# Patient Record
Sex: FEMALE | Race: WHITE | Employment: UNEMPLOYED | URBAN - METROPOLITAN AREA
[De-identification: names, ages, dates, MRNs, and addresses within clinical notes are randomized per-mention and may not be internally consistent; named-entity substitution may affect disease eponyms.]

---

## 2022-04-27 ENCOUNTER — APPOINTMENT (OUTPATIENT)
Dept: MRI IMAGING | Age: 67
DRG: 418 | End: 2022-04-27
Attending: HOSPITALIST
Payer: MEDICARE

## 2022-04-27 ENCOUNTER — HOSPITAL ENCOUNTER (INPATIENT)
Age: 67
LOS: 4 days | Discharge: HOME | DRG: 418 | End: 2022-05-03
Attending: EMERGENCY MEDICINE | Admitting: INTERNAL MEDICINE
Payer: MEDICARE

## 2022-04-27 ENCOUNTER — APPOINTMENT (OUTPATIENT)
Dept: CT IMAGING | Age: 67
DRG: 418 | End: 2022-04-27
Attending: EMERGENCY MEDICINE
Payer: MEDICARE

## 2022-04-27 DIAGNOSIS — K85.10 ACUTE BILIARY PANCREATITIS, UNSPECIFIED COMPLICATION STATUS: ICD-10-CM

## 2022-04-27 DIAGNOSIS — K85.00 IDIOPATHIC ACUTE PANCREATITIS WITHOUT INFECTION OR NECROSIS: Primary | ICD-10-CM

## 2022-04-27 PROBLEM — K85.90 ACUTE PANCREATITIS: Status: ACTIVE | Noted: 2022-04-27

## 2022-04-27 LAB
ALBUMIN SERPL-MCNC: 4.1 G/DL (ref 3.5–5)
ALBUMIN/GLOB SERPL: 1.3 {RATIO} (ref 1.1–2.2)
ALP SERPL-CCNC: 98 U/L (ref 45–117)
ALT SERPL-CCNC: 94 U/L (ref 12–78)
AMYLASE SERPL-CCNC: >1300 U/L (ref 25–115)
ANION GAP SERPL CALC-SCNC: 8 MMOL/L (ref 5–15)
APPEARANCE UR: CLEAR
AST SERPL-CCNC: 140 U/L (ref 15–37)
ATRIAL RATE: 103 BPM
BACTERIA URNS QL MICRO: NEGATIVE /HPF
BASOPHILS # BLD: 0 K/UL (ref 0–0.1)
BASOPHILS NFR BLD: 0 % (ref 0–1)
BILIRUB SERPL-MCNC: 0.9 MG/DL (ref 0.2–1)
BILIRUB UR QL: NEGATIVE
BUN SERPL-MCNC: 15 MG/DL (ref 6–20)
BUN/CREAT SERPL: 15 (ref 12–20)
CALCIUM SERPL-MCNC: 9.3 MG/DL (ref 8.5–10.1)
CALCULATED P AXIS, ECG09: 75 DEGREES
CALCULATED R AXIS, ECG10: 57 DEGREES
CALCULATED T AXIS, ECG11: -39 DEGREES
CHLORIDE SERPL-SCNC: 109 MMOL/L (ref 97–108)
CO2 SERPL-SCNC: 23 MMOL/L (ref 21–32)
COLOR UR: ABNORMAL
COMMENT, HOLDF: NORMAL
CREAT SERPL-MCNC: 0.99 MG/DL (ref 0.55–1.02)
DIAGNOSIS, 93000: NORMAL
DIFFERENTIAL METHOD BLD: ABNORMAL
EOSINOPHIL # BLD: 0 K/UL (ref 0–0.4)
EOSINOPHIL NFR BLD: 0 % (ref 0–7)
EPITH CASTS URNS QL MICRO: ABNORMAL /LPF
ERYTHROCYTE [DISTWIDTH] IN BLOOD BY AUTOMATED COUNT: 12.7 % (ref 11.5–14.5)
GLOBULIN SER CALC-MCNC: 3.2 G/DL (ref 2–4)
GLUCOSE SERPL-MCNC: 140 MG/DL (ref 65–100)
GLUCOSE UR STRIP.AUTO-MCNC: NEGATIVE MG/DL
HCT VFR BLD AUTO: 42.9 % (ref 35–47)
HGB BLD-MCNC: 13.9 G/DL (ref 11.5–16)
HGB UR QL STRIP: NEGATIVE
HYALINE CASTS URNS QL MICRO: ABNORMAL /LPF (ref 0–5)
IMM GRANULOCYTES # BLD AUTO: 0.2 K/UL (ref 0–0.04)
IMM GRANULOCYTES NFR BLD AUTO: 1 % (ref 0–0.5)
KETONES UR QL STRIP.AUTO: ABNORMAL MG/DL
LEUKOCYTE ESTERASE UR QL STRIP.AUTO: ABNORMAL
LIPASE SERPL-CCNC: >3000 U/L (ref 73–393)
LYMPHOCYTES # BLD: 0.6 K/UL (ref 0.8–3.5)
LYMPHOCYTES NFR BLD: 3 % (ref 12–49)
MCH RBC QN AUTO: 30.5 PG (ref 26–34)
MCHC RBC AUTO-ENTMCNC: 32.4 G/DL (ref 30–36.5)
MCV RBC AUTO: 94.3 FL (ref 80–99)
MONOCYTES # BLD: 1.3 K/UL (ref 0–1)
MONOCYTES NFR BLD: 6 % (ref 5–13)
NEUTS SEG # BLD: 18.8 K/UL (ref 1.8–8)
NEUTS SEG NFR BLD: 90 % (ref 32–75)
NITRITE UR QL STRIP.AUTO: NEGATIVE
NRBC # BLD: 0 K/UL (ref 0–0.01)
NRBC BLD-RTO: 0 PER 100 WBC
P-R INTERVAL, ECG05: 146 MS
PH UR STRIP: 6 [PH] (ref 5–8)
PLATELET # BLD AUTO: 208 K/UL (ref 150–400)
PMV BLD AUTO: 10.6 FL (ref 8.9–12.9)
POTASSIUM SERPL-SCNC: 4 MMOL/L (ref 3.5–5.1)
PROT SERPL-MCNC: 7.3 G/DL (ref 6.4–8.2)
PROT UR STRIP-MCNC: ABNORMAL MG/DL
Q-T INTERVAL, ECG07: 360 MS
QRS DURATION, ECG06: 66 MS
QTC CALCULATION (BEZET), ECG08: 471 MS
RBC # BLD AUTO: 4.55 M/UL (ref 3.8–5.2)
RBC #/AREA URNS HPF: ABNORMAL /HPF (ref 0–5)
RBC MORPH BLD: ABNORMAL
SAMPLES BEING HELD,HOLD: NORMAL
SODIUM SERPL-SCNC: 140 MMOL/L (ref 136–145)
SP GR UR REFRACTOMETRY: 1.02 (ref 1–1.03)
TROPONIN-HIGH SENSITIVITY: 13 NG/L (ref 0–51)
UR CULT HOLD, URHOLD: NORMAL
UROBILINOGEN UR QL STRIP.AUTO: 1 EU/DL (ref 0.2–1)
VENTRICULAR RATE, ECG03: 103 BPM
WBC # BLD AUTO: 20.9 K/UL (ref 3.6–11)
WBC URNS QL MICRO: ABNORMAL /HPF (ref 0–4)

## 2022-04-27 PROCEDURE — 74181 MRI ABDOMEN W/O CONTRAST: CPT

## 2022-04-27 PROCEDURE — 96374 THER/PROPH/DIAG INJ IV PUSH: CPT

## 2022-04-27 PROCEDURE — 83690 ASSAY OF LIPASE: CPT

## 2022-04-27 PROCEDURE — C9113 INJ PANTOPRAZOLE SODIUM, VIA: HCPCS | Performed by: HOSPITALIST

## 2022-04-27 PROCEDURE — 74011000636 HC RX REV CODE- 636: Performed by: EMERGENCY MEDICINE

## 2022-04-27 PROCEDURE — 74011000250 HC RX REV CODE- 250: Performed by: HOSPITALIST

## 2022-04-27 PROCEDURE — 93005 ELECTROCARDIOGRAM TRACING: CPT

## 2022-04-27 PROCEDURE — 74011250636 HC RX REV CODE- 250/636: Performed by: HOSPITALIST

## 2022-04-27 PROCEDURE — 80053 COMPREHEN METABOLIC PANEL: CPT

## 2022-04-27 PROCEDURE — 96375 TX/PRO/DX INJ NEW DRUG ADDON: CPT

## 2022-04-27 PROCEDURE — 0DJ08ZZ INSPECTION OF UPPER INTESTINAL TRACT, VIA NATURAL OR ARTIFICIAL OPENING ENDOSCOPIC: ICD-10-PCS | Performed by: INTERNAL MEDICINE

## 2022-04-27 PROCEDURE — G0378 HOSPITAL OBSERVATION PER HR: HCPCS

## 2022-04-27 PROCEDURE — 82150 ASSAY OF AMYLASE: CPT

## 2022-04-27 PROCEDURE — 81001 URINALYSIS AUTO W/SCOPE: CPT

## 2022-04-27 PROCEDURE — 99285 EMERGENCY DEPT VISIT HI MDM: CPT

## 2022-04-27 PROCEDURE — 36415 COLL VENOUS BLD VENIPUNCTURE: CPT

## 2022-04-27 PROCEDURE — 85025 COMPLETE CBC W/AUTO DIFF WBC: CPT

## 2022-04-27 PROCEDURE — 96376 TX/PRO/DX INJ SAME DRUG ADON: CPT

## 2022-04-27 PROCEDURE — 84484 ASSAY OF TROPONIN QUANT: CPT

## 2022-04-27 PROCEDURE — 74011250636 HC RX REV CODE- 250/636: Performed by: EMERGENCY MEDICINE

## 2022-04-27 PROCEDURE — 74177 CT ABD & PELVIS W/CONTRAST: CPT

## 2022-04-27 RX ORDER — PANTOPRAZOLE SODIUM 40 MG/10ML
40 INJECTION, POWDER, LYOPHILIZED, FOR SOLUTION INTRAVENOUS EVERY 12 HOURS
Status: DISCONTINUED | OUTPATIENT
Start: 2022-04-27 | End: 2022-05-03 | Stop reason: HOSPADM

## 2022-04-27 RX ORDER — HYDROMORPHONE HYDROCHLORIDE 1 MG/ML
0.5 INJECTION, SOLUTION INTRAMUSCULAR; INTRAVENOUS; SUBCUTANEOUS
Status: DISCONTINUED | OUTPATIENT
Start: 2022-04-27 | End: 2022-05-02

## 2022-04-27 RX ORDER — LORAZEPAM 2 MG/ML
0.5 INJECTION INTRAMUSCULAR ONCE
Status: COMPLETED | OUTPATIENT
Start: 2022-04-27 | End: 2022-04-27

## 2022-04-27 RX ORDER — CETIRIZINE HCL 10 MG
10 TABLET ORAL DAILY
Status: DISCONTINUED | OUTPATIENT
Start: 2022-04-28 | End: 2022-05-03 | Stop reason: HOSPADM

## 2022-04-27 RX ORDER — SODIUM CHLORIDE 0.9 % (FLUSH) 0.9 %
5-40 SYRINGE (ML) INJECTION EVERY 8 HOURS
Status: DISCONTINUED | OUTPATIENT
Start: 2022-04-27 | End: 2022-05-03 | Stop reason: HOSPADM

## 2022-04-27 RX ORDER — SODIUM CHLORIDE 0.9 % (FLUSH) 0.9 %
10 SYRINGE (ML) INJECTION EVERY 12 HOURS
Status: DISCONTINUED | OUTPATIENT
Start: 2022-04-27 | End: 2022-05-03 | Stop reason: HOSPADM

## 2022-04-27 RX ORDER — ACETAMINOPHEN 650 MG/1
650 SUPPOSITORY RECTAL
Status: DISCONTINUED | OUTPATIENT
Start: 2022-04-27 | End: 2022-05-03 | Stop reason: HOSPADM

## 2022-04-27 RX ORDER — ACETAMINOPHEN 325 MG/1
650 TABLET ORAL
Status: DISCONTINUED | OUTPATIENT
Start: 2022-04-27 | End: 2022-05-03 | Stop reason: HOSPADM

## 2022-04-27 RX ORDER — ONDANSETRON 2 MG/ML
4 INJECTION INTRAMUSCULAR; INTRAVENOUS
Status: DISCONTINUED | OUTPATIENT
Start: 2022-04-27 | End: 2022-04-27

## 2022-04-27 RX ORDER — PROMETHAZINE HYDROCHLORIDE 25 MG/1
12.5 TABLET ORAL
Status: DISCONTINUED | OUTPATIENT
Start: 2022-04-27 | End: 2022-04-28

## 2022-04-27 RX ORDER — POLYETHYLENE GLYCOL 3350 17 G/17G
17 POWDER, FOR SOLUTION ORAL DAILY PRN
Status: DISCONTINUED | OUTPATIENT
Start: 2022-04-27 | End: 2022-05-03 | Stop reason: HOSPADM

## 2022-04-27 RX ORDER — SODIUM CHLORIDE 0.9 % (FLUSH) 0.9 %
5-40 SYRINGE (ML) INJECTION AS NEEDED
Status: DISCONTINUED | OUTPATIENT
Start: 2022-04-27 | End: 2022-05-03 | Stop reason: HOSPADM

## 2022-04-27 RX ORDER — ONDANSETRON 2 MG/ML
4 INJECTION INTRAMUSCULAR; INTRAVENOUS
Status: DISCONTINUED | OUTPATIENT
Start: 2022-04-27 | End: 2022-05-03 | Stop reason: HOSPADM

## 2022-04-27 RX ORDER — DENOSUMAB 60 MG/ML
60 INJECTION SUBCUTANEOUS ONCE
COMMUNITY
End: 2022-05-18 | Stop reason: ALTCHOICE

## 2022-04-27 RX ORDER — ENOXAPARIN SODIUM 100 MG/ML
40 INJECTION SUBCUTANEOUS DAILY
Status: DISCONTINUED | OUTPATIENT
Start: 2022-04-28 | End: 2022-05-03 | Stop reason: HOSPADM

## 2022-04-27 RX ORDER — FENTANYL CITRATE 50 UG/ML
50 INJECTION, SOLUTION INTRAMUSCULAR; INTRAVENOUS
Status: DISCONTINUED | OUTPATIENT
Start: 2022-04-27 | End: 2022-04-27 | Stop reason: SDUPTHER

## 2022-04-27 RX ORDER — SODIUM CHLORIDE 450 MG/100ML
125 INJECTION, SOLUTION INTRAVENOUS CONTINUOUS
Status: DISCONTINUED | OUTPATIENT
Start: 2022-04-27 | End: 2022-04-28

## 2022-04-27 RX ORDER — ALPRAZOLAM 0.25 MG/1
0.5 TABLET ORAL
Status: DISCONTINUED | OUTPATIENT
Start: 2022-04-27 | End: 2022-05-03 | Stop reason: HOSPADM

## 2022-04-27 RX ADMIN — SODIUM CHLORIDE, PRESERVATIVE FREE 10 ML: 5 INJECTION INTRAVENOUS at 20:54

## 2022-04-27 RX ADMIN — PANTOPRAZOLE SODIUM 40 MG: 40 INJECTION, POWDER, FOR SOLUTION INTRAVENOUS at 20:52

## 2022-04-27 RX ADMIN — LORAZEPAM 0.5 MG: 2 INJECTION INTRAMUSCULAR; INTRAVENOUS at 19:55

## 2022-04-27 RX ADMIN — HYDROMORPHONE HYDROCHLORIDE 0.5 MG: 1 INJECTION, SOLUTION INTRAMUSCULAR; INTRAVENOUS; SUBCUTANEOUS at 15:48

## 2022-04-27 RX ADMIN — SODIUM CHLORIDE 1000 ML: 9 INJECTION, SOLUTION INTRAVENOUS at 13:28

## 2022-04-27 RX ADMIN — HYDROMORPHONE HYDROCHLORIDE 0.5 MG: 1 INJECTION, SOLUTION INTRAMUSCULAR; INTRAVENOUS; SUBCUTANEOUS at 19:23

## 2022-04-27 RX ADMIN — ONDANSETRON 4 MG: 2 INJECTION INTRAMUSCULAR; INTRAVENOUS at 13:28

## 2022-04-27 RX ADMIN — FENTANYL CITRATE 50 MCG: 50 INJECTION, SOLUTION INTRAMUSCULAR; INTRAVENOUS at 13:29

## 2022-04-27 RX ADMIN — IOPAMIDOL 100 ML: 755 INJECTION, SOLUTION INTRAVENOUS at 13:44

## 2022-04-27 RX ADMIN — SODIUM CHLORIDE 125 ML/HR: 4.5 INJECTION, SOLUTION INTRAVENOUS at 20:53

## 2022-04-27 RX ADMIN — ONDANSETRON 4 MG: 2 INJECTION INTRAMUSCULAR; INTRAVENOUS at 19:23

## 2022-04-27 RX ADMIN — SODIUM CHLORIDE, PRESERVATIVE FREE 10 ML: 5 INJECTION INTRAVENOUS at 15:48

## 2022-04-27 NOTE — H&P
[address]   HISTORY AND PHYSICAL    Name:  Rip Patel  MR#:  [id number]  :  1955  ACCOUNT #:  [de-identified]  ADMIT DATE:  2022      ADMITTING ATTENDING:  Petar Falcon MD    CHIEF COMPLAINT:  Abdominal pain. HISTORY OF PRESENT ILLNESS:  This is a 59-year-old female with past medical history of anxiety, anemia. She comes over here because of abdominal pain that started this morning. The patient says that this morning when she woke up, she was perfectly at her baseline. She started having breakfast and soon after she started having epigastric abdominal pain. She states that the pain was about 10/10 in intensity, sharp, nonradiating in the epigastric region associated with nausea and vomiting. In fact, she vomited about 4-5 times. At no point of time did she vomit any blood. She did not have any diarrhea. No fever, cough, headache, dizziness, and no changes in bowel movements. REVIEW OF SYSTEMS:  Pertinent positive as above. Rest of review of systems were done. They were all negative except for above. PAST MEDICAL HISTORY:  1. Anxiety. 2.  Chronic insomnia. PAST SURGICAL HISTORY:  1. Colonoscopy done in . 2.  Partial hysterectomy in .  3.  Salpingo-oophorectomy in . FAMILY HISTORY:  Significant for coronary artery disease, breast cancer. SOCIAL HISTORY:  Retired as a nurse. Nonsmoker, nonalcoholic, non-drug abuser. ALLERGIES:  NO KNOWN DRUG ALLERGIES. HOME MEDICATIONS:  The patient is on the following medications:  1. Fosamax 70 mg p.o. every . 2.  Multivitamin 1 p.o. daily. 3.   Xanax 0.5 mg p.o. q.8 h. p.r.n.  4.  Cetirizine 10 mg p.o. daily. PHYSICAL EXAMINATION:  VITAL SIGNS:  At the time when the patient was seen, the patient's vitals were as follows, blood pressure 111/58, pulse 104, afebrile, respiratory rate 15, saturating 95% on room air. GENERAL:  Not in acute distress.   Comfortably sitting on the bed.  HEENT:  HEAD:  Normocephalic, atraumatic. Eyes:  PERRLA. EOMI. Ears:  Bilateral hearing normal.  No growth. Nose:  No polyp or bleeding. Mouth:  Dry mucous membranes. Decent oral hygiene. NECK:  Supple. No JVD. No thyromegaly. RESPIRATORY:  Clear to auscultation bilaterally. No adventitious breath sounds. CARDIOVASCULAR:  S1 and S2 normal.  No murmurs, rubs, or gallops. GI:  Bowel sounds present. Soft. There is tenderness in the epigastric region, but no rebound, no guarding. Mason's sign is negative as well. NEUROLOGICAL:  Cranial nerves II through XII intact. Motor 5/5 bilaterally in upper limbs and lower limbs. Sensory normal.  PSYCHIATRIC:  Mood and affect appropriate. Alert and oriented x3. LABORATORY AND RADIOLOGICAL RESULTS:  WBC 20.9, hemoglobin 13.9, hematocrit 42, and platelet count of 925. Urinalysis does not suggest any signs of infection. Sodium 140, potassium 4.0, chloride 109, bicarbonate 23, BUN 15, creatinine 0.99. ALT is 94, AST is 140. CT scan of the abdomen was done which showed nonspecific thickening  and regular fluid filled lumen with no presence of free air. Minimal gallbladder sludge, but otherwise unremarkable gallbladder and biliary ductal system. Lipase of greater than 3000. ASSESSMENT AND PLAN:  This is a 68-year-old female with no significant past medical history except for anxiety. She comes over here with epigastric abdominal pain, nausea and vomiting. 1.  Epigastric abdominal pain, nausea and vomiting, unclear etiology, probably acute pancreatitis versus  disease. We will admit the patient under observation. We will keep the patient nothing per os, intravenous fluids and we will consult Gastroenterology. I have already conveyed the message to the gastroenterologist.  We will seek their recommendation. Meanwhile, we will get the MRI of the abdomen done. The patient is claustrophobic. We will give the patient Ativan before MRI.   I will also put the patient on Protonix. 2.  Anxiety. We will continue the patient's home medications. I spent about 45 minutes in taking care of the patient.       Isa Harris MD      [id number]  D:  04/27/2022 15:35  T:  04/27/2022 17:25  JOB #:  [id number]

## 2022-04-27 NOTE — ED NOTES
Has a final transfer review been performed? Yes    Reason for Admission: pancreatitis  Patient comes from: private residence  Mental Status: Alert and oriented  ADL:self care  Ambulation:no difficulty  Pertinent Info/Safety Concerns: N/A  COVID Status: Negative  MEWS Score: 1  Vitals:    04/27/22 1134 04/27/22 1332 04/27/22 1748 04/27/22 1928   BP: 111/68   118/73   Pulse: (!) 104   88   Resp:  16  16   Temp: 97 °F (36.1 °C)   98.6 °F (37 °C)   SpO2: 98%   97%   Weight:   65.8 kg (145 lb)    Height:   5' 2\" (1.575 m)      Transport mode:Wheelchair    TRANSFER - OUT REPORT:    Report consisted of patient's Situation, Background, Assessment and   Recommendations(SBAR). Information from the following report(s) SBAR, Kardex, ED Summary and MAR was reviewed with the receiving nurse. Lines:   Peripheral IV 04/27/22 Left Antecubital (Active)   Site Assessment Clean, dry, & intact 04/27/22 1240   Phlebitis Assessment 0 04/27/22 1240   Infiltration Assessment 0 04/27/22 1240   Dressing Status Clean, dry, & intact 04/27/22 1240   Dressing Type Transparent 04/27/22 1240   Hub Color/Line Status Pink 04/27/22 1240   Action Taken Blood drawn 04/27/22 1240        Opportunity for questions and clarification was provided.

## 2022-04-27 NOTE — ED TRIAGE NOTES
Pt c/o upper abd pain with n/v since this am, denies fever, pt thinks this is a hot gallbladder , denies diarrhea or constipation, denies urinary symptoms, had 2 soft stools this am , pt appears uncomfortable

## 2022-04-27 NOTE — ED PROVIDER NOTES
The history is provided by the patient. Epigastric Pain   This is a new problem. The current episode started 1 to 2 hours ago. The problem occurs constantly. The problem has not changed since onset. The pain is associated with vomiting. The pain is located in the epigastric region. The quality of the pain is aching and sharp. The pain is moderate. Associated symptoms include nausea and vomiting. Pertinent negatives include no fever and no diarrhea. Nothing worsens the pain. The pain is relieved by nothing. Her past medical history is significant for gallstones. The patient's surgical history non-contributory.        Past Medical History:   Diagnosis Date    Anemia NEC     Anxiety & Panic Attacks r/t flying, travel, claustrophobia 5/11/2011    Asthma, Mild Intermittent 5/11/2011    Cancer ([facility] [address]     skin/left hand    Cancer ([facility] [address]     skin /left hand    Chronic insomnia 5/11/2011    Chronic Left Ankle Swelling 5/11/2011    Claustrophobia 5/11/2011    Contact dermatitis and other eczema, due to unspecified cause     Cystocele & Rectocele 4/10/2012    Fracture of left foot, 2nd, 3rd, 4th Metatarsals 6/2008 5/11/2011    H/O Migraines 5/10/2011    H/O PVCs (premature ventricular contractions) 4/10/2012    H/O Right groin pain r/t labral tear, 2002 4/10/2012    History of severe iron deficiency anemia r/t menorrhagia, s/p hysterectomy 5/11/2011    Incisional hernia at hysterectomy scar 4/10/2012    Left ankle sprain 5/2009 5/11/2011    Osteoporosis 2/14/2014    Perennial allergic rhinitis 5/11/2011    Screening exams for skin cancer 1/21/2014    Derm Dr Kathryn Reed; AK's, Jude K's    Vitamin D deficiency 01-       Past Surgical History:   Procedure Laterality Date    BONE MARROW ASPIRATE &BIOPSY  1979    iron def anemia    COLONOSCOPY  6/2012    Benign hyperplaastic polyp June 18, 2012 per Dr Bandar Pop; f/u 10 yrs    HX MALIGNANT SKIN LESION EXCISION  5/2011, repeat 6/2011    SCC excised left hand; Dr Smith Salvage ORTHOPAEDIC  2013    Right foot surgery, Dr Quinton Kumar and Endometriosis    HX PELVIC LAPAROSCOPY      HX SALPINGO-OOPHORECTOMY      RSO; Endometriosis & thrombosis of right tube    US ASPIRATION BREAST      Right breast, Dr Patricia Manley; multiple         Family History:   Problem Relation Age of Onset    Lung Disease Mother     Heart Disease Mother     Breast Cancer Mother 78        diagnosed 78 2011    Cancer Father     Breast Cancer Maternal Aunt         diagnosed in her late 29's, survived it after mastectomy;  natural causes 80    Hypertension Mother     Stroke Mother 68       Social History     Socioeconomic History    Marital status:      Spouse name: Not on file    Number of children: 2    Years of education: Not on file    Highest education level: Not on file   Occupational History    Occupation: Retired Nurse   Tobacco Use    Smoking status: Never Smoker    Smokeless tobacco: Never Used   Substance and Sexual Activity    Alcohol use: Yes     Comment: just very occasional    Drug use: No    Sexual activity: Yes     Partners: Male   Other Topics Concern     Service Not Asked    Blood Transfusions Not Asked    Caffeine Concern No     Comment: 2 1/2 mugs of coffee qd, no sodas or tea    Occupational Exposure Not Asked    Hobby Hazards Not Asked    Sleep Concern Not Asked    Stress Concern Not Asked    Weight Concern No     Comment: pretty stable over the yrs; she prefers to stay in the 135-138 range    Special Diet No     Comment: just follows sensible diet    Back Care Not Asked    Exercise Yes     Comment: runs 3 miles 4 x a week    Bike Helmet Not Asked    Seat Belt Not Asked    Self-Exams Not Asked   Social History Narrative    Not on file     Social Determinants of Health     Financial Resource Strain:     Difficulty of Paying Living Expenses: Not on file   Food Insecurity:     Worried About Running Out of Food in the Last Year: Not on file    Jose Miguel of Food in the Last Year: Not on file   Transportation Needs:     Lack of Transportation (Medical): Not on file    Lack of Transportation (Non-Medical): Not on file   Physical Activity:     Days of Exercise per Week: Not on file    Minutes of Exercise per Session: Not on file   Stress:     Feeling of Stress : Not on file   Social Connections:     Frequency of Communication with Friends and Family: Not on file    Frequency of Social Gatherings with Friends and Family: Not on file    Attends Rastafari Services: Not on file    Active Member of [address] [place] [address] [employer] or Organizations: Not on file    Attends Club or Organization Meetings: Not on file    Marital Status: Not on file   Intimate Partner Violence:     Fear of Current or Ex-Partner: Not on file    Emotionally Abused: Not on file    Physically Abused: Not on file    Sexually Abused: Not on file   Housing Stability:     Unable to Pay for Housing in the Last Year: Not on file    Number of [place] in the Last Year: Not on file    Unstable Housing in the Last Year: Not on file         ALLERGIES: Patient has no known allergies. Review of Systems   Constitutional: Negative for fever. Gastrointestinal: Positive for nausea and vomiting. Negative for diarrhea. All other systems reviewed and are negative. Vitals:    04/27/22 1134 04/27/22 1332   BP: 111/68    Pulse: (!) 104    Resp:  16   Temp: 97 °F (36.1 °C)    SpO2: 98%             Physical Exam  Vitals and nursing note reviewed. Constitutional:       General: She is not in acute distress. Appearance: She is well-developed. HENT:      Head: Normocephalic and atraumatic. Eyes:      Conjunctiva/sclera: Conjunctivae normal.   Cardiovascular:      Rate and Rhythm: Normal rate and regular rhythm. Heart sounds: Normal heart sounds.    Pulmonary:      Effort: Pulmonary effort is normal. No respiratory distress. Breath sounds: Normal breath sounds. Abdominal:      General: There is no distension. Palpations: Abdomen is soft. Tenderness: There is abdominal tenderness in the epigastric area. There is guarding. There is no rebound. Negative signs include Mason's sign and McBurney's sign. Musculoskeletal:         General: No deformity. Normal range of motion. Cervical back: Neck supple. Skin:     General: Skin is warm and dry. Neurological:      Mental Status: She is alert. Cranial Nerves: No cranial nerve deficit. Psychiatric:         Behavior: Behavior normal.          MDM     40-year-old female presents with sudden onset of upper abdominal pain starting this morning after breakfast.  She has notable dramatic lipase elevation and stated history of gallstones but has not had any symptoms from them in many years. Procedures    Perfect Serve Consult for Admission  2:02 PM    ED Room Number: R32/R32  Patient Name and age:  Jaclyn Johnson 74 y.o.  female  Working Diagnosis:   1.  Idiopathic acute pancreatitis without infection or necrosis        COVID-19 Suspicion:  no  Sepsis present:  no  Reassessment needed: no  Code Status:  Full Code  Readmission: no  Isolation Requirements:  no  Recommended Level of Care:  med/surg  Department:[facility] Adult ED - [phone]

## 2022-04-27 NOTE — CONSULTS
[address] [facility].   [address] [facility] [address]        GASTROENTEROLOGY CONSULTATION NOTE  Will Araceli Chiu  [phone] office  [phone] NP/PA in-hospital cell phone M-F until 4:30PM  After 5PM or on weekends, please call  for physician on call        NAME:  Coleen Cummings   :   1955   MRN:   [id number]       Referring Physician: Dr. Abigail Cardoza Date: 2022 3:58 PM    Chief Complaint: abdominal pain     History of Present Illness:  Patient is a 74 y.o. who is seen in consultation at the request of Dr. Daniel Kenney for acute pancreatitis. Patient presented to the ED with complaints of abdominal pain. She was admitted to the hospital today, 22. Patient reports a sudden onset of severe epigastric abdominal pain approximately 30 minutes after eating breakfast this morning (coffee and cereal). Pain has been constant and described as \"labor\" pain. No clear aggravating or alleviating factors. There has been associated nausea with multiple episodes of vomiting and dry heaves. No hematemesis. No reflux. No change in bowel habits, melena, or hematochezia. Patient reports having approximately 3 soft bowel movements today. No fevers. Remote history of similar symptoms in the s. Patient does not take any antacids. No NSAID use. No anticoagulation. Occasional alcohol use (1-2 drinks/week). No tobacco use. History of GYN surgeries; no other abdominal surgeries. No history of EGD. Colonoscopy in 2012 by Dr. Nara Will. I have reviewed the emergency room note, hospital admission note, notes by all other clinicians who have seen the patient during this hospitalization to date. I have reviewed the problem list and the reason for this hospitalization. I have reviewed the allergies and the medications the patient was taking at home prior to this hospitalization.     PMH:  Past Medical History:   Diagnosis Date    Anemia NEC     Anxiety & Panic Attacks r/t flying, travel, claustrophobia 5/11/2011    Asthma, Mild Intermittent 5/11/2011    Cancer ([facility] [address]     skin/left hand    Cancer ([facility] [address]     skin /left hand    Chronic insomnia 5/11/2011    Chronic Left Ankle Swelling 5/11/2011    Claustrophobia 5/11/2011    Contact dermatitis and other eczema, due to unspecified cause     Cystocele & Rectocele 4/10/2012    Fracture of left foot, 2nd, 3rd, 4th Metatarsals 6/2008 5/11/2011    H/O Migraines 5/10/2011    H/O PVCs (premature ventricular contractions) 4/10/2012    H/O Right groin pain r/t labral tear, 2002 4/10/2012    History of severe iron deficiency anemia r/t menorrhagia, s/p hysterectomy 5/11/2011    Incisional hernia at hysterectomy scar 4/10/2012    Left ankle sprain 5/2009 5/11/2011    Osteoporosis 2/14/2014    Perennial allergic rhinitis 5/11/2011    Screening exams for skin cancer 1/21/2014    Derm Dr Wisam Perry; AK's, Jude K's    Vitamin D deficiency 01-       PSH:  Past Surgical History:   Procedure Laterality Date    BONE MARROW ASPIRATE &BIOPSY  1979    iron def anemia    COLONOSCOPY  6/2012    Benign hyperplaastic polyp June 18, 2012 per Dr Mike Dyson; f/u 10 yrs    HX MALIGNANT SKIN LESION EXCISION  5/2011, repeat 6/2011    SCC excised left hand; Dr Avila Splinter ORTHOPAEDIC  1/2013    Right foot surgery, Dr No Waller and Endometriosis    HX PELVIC LAPAROSCOPY  1987    HX SALPINGO-OOPHORECTOMY  1987    RSO; Endometriosis & thrombosis of right tube    US ASPIRATION BREAST      Right breast, Dr Noriega; multiple       Allergies:  No Known Allergies    Home Medications:  Prior to Admission Medications   Prescriptions Last Dose Informant Patient Reported? Taking?    alendronate (FOSAMAX) 70 mg tablet   No No   Sig: TAKE 1 TABLET EVERY SUNDAY   alprazolam (XANAX) 0.5 mg tablet   No No   Sig: Take 1 tablet by mouth every eight (8) hours as needed for Anxiety (related to flying or claustrophobia). cetirizine (ZYRTEC) 10 mg tablet   Yes No   Sig: Take 10 mg by mouth daily. multivitamin (ONE A DAY) tablet   Yes No   Sig: Take 1 Tab by mouth daily. Includes 1000 units VITAMIN D      Facility-Administered Medications: None       Hospital Medications:  Current Facility-Administered Medications   Medication Dose Route Frequency    ondansetron (ZOFRAN) injection 4 mg  4 mg IntraVENous Q1H PRN    [START ON 4/28/2022] cetirizine (ZYRTEC) tablet 10 mg  10 mg Oral DAILY    ALPRAZolam (XANAX) tablet 0.5 mg  0.5 mg Oral Q8H PRN    sodium chloride (NS) flush 5-40 mL  5-40 mL IntraVENous Q8H    sodium chloride (NS) flush 5-40 mL  5-40 mL IntraVENous PRN    acetaminophen (TYLENOL) tablet 650 mg  650 mg Oral Q6H PRN    Or    acetaminophen (TYLENOL) suppository 650 mg  650 mg Rectal Q6H PRN    polyethylene glycol (MIRALAX) packet 17 g  17 g Oral DAILY PRN    promethazine (PHENERGAN) tablet 12.5 mg  12.5 mg Oral Q6H PRN    Or    ondansetron (ZOFRAN) injection 4 mg  4 mg IntraVENous Q6H PRN    [START ON 4/28/2022] enoxaparin (LOVENOX) injection 40 mg  40 mg SubCUTAneous DAILY    HYDROmorphone (DILAUDID) injection 0.5 mg  0.5 mg IntraVENous Q4H PRN    0.45% sodium chloride infusion  125 mL/hr IntraVENous CONTINUOUS    LORazepam (ATIVAN) injection 0.5 mg  0.5 mg IntraVENous ONCE    pantoprazole (PROTONIX) injection 40 mg  40 mg IntraVENous Q12H    And    sodium chloride (NS) flush 10 mL  10 mL IntraVENous Q12H     Current Outpatient Medications   Medication Sig    alendronate (FOSAMAX) 70 mg tablet TAKE 1 TABLET EVERY SUNDAY    alprazolam (XANAX) 0.5 mg tablet Take 1 tablet by mouth every eight (8) hours as needed for Anxiety (related to flying or claustrophobia).  multivitamin (ONE A DAY) tablet Take 1 Tab by mouth daily. Includes 1000 units VITAMIN D    cetirizine (ZYRTEC) 10 mg tablet Take 10 mg by mouth daily.        Social History:  Social History     Tobacco Use    Smoking status: Never Smoker    Smokeless tobacco: Never Used   Substance Use Topics    Alcohol use: Yes     Comment: just very occasional       Family History:  Family History   Problem Relation Age of Onset    Lung Disease Mother     Heart Disease Mother    Denisse Outhouse Breast Cancer Mother 78        diagnosed 78 2011    Cancer Father     Breast Cancer Maternal Aunt         diagnosed in her late 29's, survived it after mastectomy;  natural causes 80    Hypertension Mother     Stroke Mother 68     Review of Systems:  Constitutional: negative fever, negative weight loss  Eyes:   negative visual changes  ENT:   negative sore throat, tongue or lip swelling  Respiratory:  negative cough, negative dyspnea  Cards:  negative for chest pain, palpitations, lower extremity edema  GI:   See HPI  :  negative for frequency, dysuria  Integument:  negative for rash and pruritus  Heme:  negative for easy bruising and gum/nose bleeding  Musculoskeletal:negative for myalgias, back pain and muscle weakness  Neuro:    negative for headaches, dizziness  Psych: negative for feelings of anxiety, depression     Objective:     Patient Vitals for the past 8 hrs:   BP Temp Pulse Resp SpO2   22 1332    16    22 1134 111/68 97 °F (36.1 °C) (!) 104  98 %     No intake/output data recorded. No intake/output data recorded. EXAM:     CONST:  Pleasant female lying in bed, no acute distress   NEURO:  Alert and oriented x 3   HEENT: EOMI, no scleral icterus   LUNGS: No acute respiratory distress   CARD:  S1 S2   ABD:  Soft, non distended, epigastric tenderness, no rebound, no guarding. + Bowel sounds.     EXT:  Warm    PSYCH: Full, not anxious or agitated     Data Review     Recent Labs     22  1241   WBC 20.9*   HGB 13.9   HCT 42.9        Recent Labs     22  1241      K 4.0   *   CO2 23   BUN 15   CREA 0.99   *   CA 9.3     Recent Labs     22  1241   AP 98   TP 7.3   ALB 4.1   GLOB 3.2 LPSE >3,000*     No results for input(s): INR, PTP, APTT, INREXT in the last 72 hours. Assessment:   · Epigastric abdominal pain with nausea/vomiting: WBC 20.9, Hgb 13.9, , ALT 94, alkaline phosphatase 98, total bilirubin 0.9, lipase >3,000. CT abdomen/pelvis with IV contrast (4/27/22): nonspecific thickening of the second duodenal wall with wall thickening and irregular fluid-filled lumen but no abscess or free air, consider peptic disease/ulcer; minimal gallbladder sludge but otherwise unremarkable gallbladder and biliary ductal system; unremarkable pancreas. Differential includes peptic ulcer disease, acute pancreatitis possibly secondary to biliary sludge, and malignancy. · Elevated lipase: lipase >3,000, no evidence of pancreatitis on CT scan; thickening of duodenum as above. Patient Active Problem List   Diagnosis Code    H/O Migraines G45.26    Perennial allergic rhinitis J30.89    H/O Asthma, Mild Intermittent J45.909    Chronic insomnia F51.04    Claustrophobia F40.240    Anxiety & Panic Attacks r/t flying, travel, claustrophobia F41.9    Fracture of left foot, 2nd, 3rd, 4th Metatarsals 6/2008 S92.902A    Left ankle sprain 5/2009 D43.794S    Chronic Left Ankle Swelling M25.472    History of severe iron deficiency anemia r/t menorrhagia, s/p hysterectomy Z86.2    Cystocele & Rectocele [id number]    Vitamin D deficiency E55.9    H/O Right groin pain r/t labral tear, 2002 R10.31    Incisional hernia at hysterectomy scar K43.2    H/O PVCs (premature ventricular contractions) I49.3    Screening exams for skin cancer Z12.83    Osteoporosis M81.0    Acute pancreatitis K85.90     Plan:     · NPO  · PPI BID  · IVF  · Supportive measures: antiemetics and analgesics PRN  · Monitor LFTs  · MRI/MRCP pending  · Tentatively plan for EGD tomorrow with Dr. Melanie Ndiaye.  Details and risks of the procedure to include (but not limited to) anesthesia, bleeding, infection, and perforation were discussed.   · Patient was discussed with Dr. Armando Mann  · Thank you for allowing me to participate in care of Christine Kruger     Signed By: Franck Kang     4/27/2022  3:58 PM

## 2022-04-28 ENCOUNTER — ANESTHESIA EVENT (OUTPATIENT)
Dept: ENDOSCOPY | Age: 67
DRG: 418 | End: 2022-04-28
Payer: MEDICARE

## 2022-04-28 ENCOUNTER — APPOINTMENT (OUTPATIENT)
Dept: ULTRASOUND IMAGING | Age: 67
DRG: 418 | End: 2022-04-28
Attending: INTERNAL MEDICINE
Payer: MEDICARE

## 2022-04-28 ENCOUNTER — ANESTHESIA (OUTPATIENT)
Dept: ENDOSCOPY | Age: 67
DRG: 418 | End: 2022-04-28
Payer: MEDICARE

## 2022-04-28 LAB
ALBUMIN SERPL-MCNC: 3.6 G/DL (ref 3.5–5)
ALBUMIN/GLOB SERPL: 1.3 {RATIO} (ref 1.1–2.2)
ALP SERPL-CCNC: 78 U/L (ref 45–117)
ALT SERPL-CCNC: 70 U/L (ref 12–78)
ANION GAP SERPL CALC-SCNC: 6 MMOL/L (ref 5–15)
AST SERPL-CCNC: 53 U/L (ref 15–37)
BASOPHILS # BLD: 0 K/UL (ref 0–0.1)
BASOPHILS NFR BLD: 0 % (ref 0–1)
BILIRUB SERPL-MCNC: 0.8 MG/DL (ref 0.2–1)
BUN SERPL-MCNC: 13 MG/DL (ref 6–20)
BUN/CREAT SERPL: 16 (ref 12–20)
CALCIUM SERPL-MCNC: 8.3 MG/DL (ref 8.5–10.1)
CHLORIDE SERPL-SCNC: 111 MMOL/L (ref 97–108)
CO2 SERPL-SCNC: 25 MMOL/L (ref 21–32)
CREAT SERPL-MCNC: 0.8 MG/DL (ref 0.55–1.02)
DIFFERENTIAL METHOD BLD: ABNORMAL
EOSINOPHIL # BLD: 0 K/UL (ref 0–0.4)
EOSINOPHIL NFR BLD: 0 % (ref 0–7)
ERYTHROCYTE [DISTWIDTH] IN BLOOD BY AUTOMATED COUNT: 13.2 % (ref 11.5–14.5)
GLOBULIN SER CALC-MCNC: 2.8 G/DL (ref 2–4)
GLUCOSE SERPL-MCNC: 123 MG/DL (ref 65–100)
HCT VFR BLD AUTO: 40.1 % (ref 35–47)
HGB BLD-MCNC: 12.8 G/DL (ref 11.5–16)
IMM GRANULOCYTES # BLD AUTO: 0.1 K/UL (ref 0–0.04)
IMM GRANULOCYTES NFR BLD AUTO: 1 % (ref 0–0.5)
LIPASE SERPL-CCNC: >3000 U/L (ref 73–393)
LYMPHOCYTES # BLD: 1 K/UL (ref 0.8–3.5)
LYMPHOCYTES NFR BLD: 6 % (ref 12–49)
MCH RBC QN AUTO: 30.8 PG (ref 26–34)
MCHC RBC AUTO-ENTMCNC: 31.9 G/DL (ref 30–36.5)
MCV RBC AUTO: 96.4 FL (ref 80–99)
MONOCYTES # BLD: 0.7 K/UL (ref 0–1)
MONOCYTES NFR BLD: 4 % (ref 5–13)
NEUTS SEG # BLD: 15.1 K/UL (ref 1.8–8)
NEUTS SEG NFR BLD: 89 % (ref 32–75)
NRBC # BLD: 0 K/UL (ref 0–0.01)
NRBC BLD-RTO: 0 PER 100 WBC
PLATELET # BLD AUTO: 191 K/UL (ref 150–400)
PMV BLD AUTO: 11.2 FL (ref 8.9–12.9)
POTASSIUM SERPL-SCNC: 4.3 MMOL/L (ref 3.5–5.1)
PROT SERPL-MCNC: 6.4 G/DL (ref 6.4–8.2)
RBC # BLD AUTO: 4.16 M/UL (ref 3.8–5.2)
SODIUM SERPL-SCNC: 142 MMOL/L (ref 136–145)
WBC # BLD AUTO: 16.9 K/UL (ref 3.6–11)

## 2022-04-28 PROCEDURE — 85025 COMPLETE CBC W/AUTO DIFF WBC: CPT

## 2022-04-28 PROCEDURE — 74011000250 HC RX REV CODE- 250: Performed by: NURSE PRACTITIONER

## 2022-04-28 PROCEDURE — 96376 TX/PRO/DX INJ SAME DRUG ADON: CPT

## 2022-04-28 PROCEDURE — G0378 HOSPITAL OBSERVATION PER HR: HCPCS

## 2022-04-28 PROCEDURE — 74011000250 HC RX REV CODE- 250: Performed by: HOSPITALIST

## 2022-04-28 PROCEDURE — 76040000019: Performed by: INTERNAL MEDICINE

## 2022-04-28 PROCEDURE — 74011250636 HC RX REV CODE- 250/636: Performed by: NURSE PRACTITIONER

## 2022-04-28 PROCEDURE — 80053 COMPREHEN METABOLIC PANEL: CPT

## 2022-04-28 PROCEDURE — 77030019957 HC CUF BLN GASTSCP OCOA -B: Performed by: INTERNAL MEDICINE

## 2022-04-28 PROCEDURE — 74011250637 HC RX REV CODE- 250/637: Performed by: HOSPITALIST

## 2022-04-28 PROCEDURE — 83690 ASSAY OF LIPASE: CPT

## 2022-04-28 PROCEDURE — C9113 INJ PANTOPRAZOLE SODIUM, VIA: HCPCS | Performed by: HOSPITALIST

## 2022-04-28 PROCEDURE — 74011000250 HC RX REV CODE- 250: Performed by: ANESTHESIOLOGY

## 2022-04-28 PROCEDURE — 76060000031 HC ANESTHESIA FIRST 0.5 HR: Performed by: INTERNAL MEDICINE

## 2022-04-28 PROCEDURE — 99203 OFFICE O/P NEW LOW 30 MIN: CPT

## 2022-04-28 PROCEDURE — 74011250636 HC RX REV CODE- 250/636: Performed by: ANESTHESIOLOGY

## 2022-04-28 PROCEDURE — 96375 TX/PRO/DX INJ NEW DRUG ADDON: CPT

## 2022-04-28 PROCEDURE — 2709999900 HC NON-CHARGEABLE SUPPLY: Performed by: INTERNAL MEDICINE

## 2022-04-28 PROCEDURE — 36415 COLL VENOUS BLD VENIPUNCTURE: CPT

## 2022-04-28 PROCEDURE — 96372 THER/PROPH/DIAG INJ SC/IM: CPT

## 2022-04-28 PROCEDURE — 74011250636 HC RX REV CODE- 250/636: Performed by: HOSPITALIST

## 2022-04-28 RX ORDER — ONDANSETRON 2 MG/ML
INJECTION INTRAMUSCULAR; INTRAVENOUS AS NEEDED
Status: DISCONTINUED | OUTPATIENT
Start: 2022-04-28 | End: 2022-04-28 | Stop reason: HOSPADM

## 2022-04-28 RX ORDER — SODIUM CHLORIDE 9 MG/ML
INJECTION, SOLUTION INTRAVENOUS
Status: DISCONTINUED | OUTPATIENT
Start: 2022-04-28 | End: 2022-04-28 | Stop reason: HOSPADM

## 2022-04-28 RX ORDER — SODIUM CHLORIDE 9 MG/ML
100 INJECTION, SOLUTION INTRAVENOUS CONTINUOUS
Status: DISPENSED | OUTPATIENT
Start: 2022-04-28 | End: 2022-04-28

## 2022-04-28 RX ORDER — EPINEPHRINE 0.1 MG/ML
1 INJECTION INTRACARDIAC; INTRAVENOUS
Status: DISCONTINUED | OUTPATIENT
Start: 2022-04-28 | End: 2022-04-28 | Stop reason: HOSPADM

## 2022-04-28 RX ORDER — SODIUM CHLORIDE, SODIUM LACTATE, POTASSIUM CHLORIDE, CALCIUM CHLORIDE 600; 310; 30; 20 MG/100ML; MG/100ML; MG/100ML; MG/100ML
100 INJECTION, SOLUTION INTRAVENOUS CONTINUOUS
Status: DISCONTINUED | OUTPATIENT
Start: 2022-04-28 | End: 2022-05-03

## 2022-04-28 RX ORDER — LIDOCAINE HYDROCHLORIDE 20 MG/ML
INJECTION, SOLUTION EPIDURAL; INFILTRATION; INTRACAUDAL; PERINEURAL AS NEEDED
Status: DISCONTINUED | OUTPATIENT
Start: 2022-04-28 | End: 2022-04-28 | Stop reason: HOSPADM

## 2022-04-28 RX ORDER — SODIUM CHLORIDE 0.9 % (FLUSH) 0.9 %
5-40 SYRINGE (ML) INJECTION EVERY 8 HOURS
Status: DISCONTINUED | OUTPATIENT
Start: 2022-04-28 | End: 2022-05-03 | Stop reason: HOSPADM

## 2022-04-28 RX ORDER — PROPOFOL 10 MG/ML
INJECTION, EMULSION INTRAVENOUS AS NEEDED
Status: DISCONTINUED | OUTPATIENT
Start: 2022-04-28 | End: 2022-04-28 | Stop reason: HOSPADM

## 2022-04-28 RX ORDER — ATROPINE SULFATE 0.1 MG/ML
0.5 INJECTION INTRAVENOUS
Status: DISCONTINUED | OUTPATIENT
Start: 2022-04-28 | End: 2022-04-28 | Stop reason: HOSPADM

## 2022-04-28 RX ORDER — PHENYLEPHRINE HCL IN 0.9% NACL 0.4MG/10ML
SYRINGE (ML) INTRAVENOUS AS NEEDED
Status: DISCONTINUED | OUTPATIENT
Start: 2022-04-28 | End: 2022-04-28 | Stop reason: HOSPADM

## 2022-04-28 RX ORDER — DIPHENHYDRAMINE HYDROCHLORIDE 50 MG/ML
INJECTION, SOLUTION INTRAMUSCULAR; INTRAVENOUS AS NEEDED
Status: DISCONTINUED | OUTPATIENT
Start: 2022-04-28 | End: 2022-04-28 | Stop reason: HOSPADM

## 2022-04-28 RX ORDER — DIPHENHYDRAMINE HYDROCHLORIDE 50 MG/ML
INJECTION, SOLUTION INTRAMUSCULAR; INTRAVENOUS
Status: COMPLETED
Start: 2022-04-28 | End: 2022-04-28

## 2022-04-28 RX ORDER — FENTANYL CITRATE 50 UG/ML
25-200 INJECTION, SOLUTION INTRAMUSCULAR; INTRAVENOUS
Status: DISCONTINUED | OUTPATIENT
Start: 2022-04-28 | End: 2022-04-28 | Stop reason: HOSPADM

## 2022-04-28 RX ORDER — FLUMAZENIL 0.1 MG/ML
0.2 INJECTION INTRAVENOUS
Status: DISCONTINUED | OUTPATIENT
Start: 2022-04-28 | End: 2022-04-28 | Stop reason: HOSPADM

## 2022-04-28 RX ORDER — SODIUM CHLORIDE 0.9 % (FLUSH) 0.9 %
5-40 SYRINGE (ML) INJECTION AS NEEDED
Status: DISCONTINUED | OUTPATIENT
Start: 2022-04-28 | End: 2022-05-03 | Stop reason: HOSPADM

## 2022-04-28 RX ORDER — NALOXONE HYDROCHLORIDE 0.4 MG/ML
0.4 INJECTION, SOLUTION INTRAMUSCULAR; INTRAVENOUS; SUBCUTANEOUS
Status: DISCONTINUED | OUTPATIENT
Start: 2022-04-28 | End: 2022-04-28 | Stop reason: HOSPADM

## 2022-04-28 RX ORDER — MIDAZOLAM HYDROCHLORIDE 1 MG/ML
.25-1 INJECTION, SOLUTION INTRAMUSCULAR; INTRAVENOUS
Status: DISCONTINUED | OUTPATIENT
Start: 2022-04-28 | End: 2022-04-28 | Stop reason: HOSPADM

## 2022-04-28 RX ORDER — DEXTROMETHORPHAN/PSEUDOEPHED 2.5-7.5/.8
1.2 DROPS ORAL
Status: DISCONTINUED | OUTPATIENT
Start: 2022-04-28 | End: 2022-04-28 | Stop reason: HOSPADM

## 2022-04-28 RX ORDER — MIDAZOLAM HYDROCHLORIDE 1 MG/ML
INJECTION, SOLUTION INTRAMUSCULAR; INTRAVENOUS AS NEEDED
Status: DISCONTINUED | OUTPATIENT
Start: 2022-04-28 | End: 2022-04-28 | Stop reason: HOSPADM

## 2022-04-28 RX ADMIN — ONDANSETRON 4 MG: 2 INJECTION INTRAMUSCULAR; INTRAVENOUS at 01:06

## 2022-04-28 RX ADMIN — PROPOFOL 25 MG: 10 INJECTION, EMULSION INTRAVENOUS at 15:02

## 2022-04-28 RX ADMIN — HYDROMORPHONE HYDROCHLORIDE 0.5 MG: 1 INJECTION, SOLUTION INTRAMUSCULAR; INTRAVENOUS; SUBCUTANEOUS at 06:53

## 2022-04-28 RX ADMIN — MIDAZOLAM HYDROCHLORIDE 2 MG: 1 INJECTION, SOLUTION INTRAMUSCULAR; INTRAVENOUS at 14:33

## 2022-04-28 RX ADMIN — ONDANSETRON HYDROCHLORIDE 4 MG: 2 INJECTION, SOLUTION INTRAMUSCULAR; INTRAVENOUS at 14:31

## 2022-04-28 RX ADMIN — SODIUM CHLORIDE, PRESERVATIVE FREE 10 ML: 5 INJECTION INTRAVENOUS at 09:00

## 2022-04-28 RX ADMIN — HYDROMORPHONE HYDROCHLORIDE 0.5 MG: 1 INJECTION, SOLUTION INTRAMUSCULAR; INTRAVENOUS; SUBCUTANEOUS at 01:07

## 2022-04-28 RX ADMIN — PROPOFOL 25 MG: 10 INJECTION, EMULSION INTRAVENOUS at 14:49

## 2022-04-28 RX ADMIN — PROPOFOL 60 MG: 10 INJECTION, EMULSION INTRAVENOUS at 14:47

## 2022-04-28 RX ADMIN — Medication 80 MCG: at 14:57

## 2022-04-28 RX ADMIN — PANTOPRAZOLE SODIUM 40 MG: 40 INJECTION, POWDER, FOR SOLUTION INTRAVENOUS at 09:00

## 2022-04-28 RX ADMIN — SODIUM CHLORIDE, PRESERVATIVE FREE 10 ML: 5 INJECTION INTRAVENOUS at 05:02

## 2022-04-28 RX ADMIN — PROPOFOL 25 MG: 10 INJECTION, EMULSION INTRAVENOUS at 14:56

## 2022-04-28 RX ADMIN — ENOXAPARIN SODIUM 40 MG: 100 INJECTION SUBCUTANEOUS at 09:00

## 2022-04-28 RX ADMIN — SODIUM CHLORIDE 125 ML/HR: 4.5 INJECTION, SOLUTION INTRAVENOUS at 05:02

## 2022-04-28 RX ADMIN — SODIUM CHLORIDE, PRESERVATIVE FREE 5 MG: 5 INJECTION INTRAVENOUS at 10:28

## 2022-04-28 RX ADMIN — MIDAZOLAM HYDROCHLORIDE 1 MG: 1 INJECTION, SOLUTION INTRAMUSCULAR; INTRAVENOUS at 14:43

## 2022-04-28 RX ADMIN — PROPOFOL 25 MG: 10 INJECTION, EMULSION INTRAVENOUS at 14:52

## 2022-04-28 RX ADMIN — SODIUM CHLORIDE, PRESERVATIVE FREE 10 ML: 5 INJECTION INTRAVENOUS at 22:14

## 2022-04-28 RX ADMIN — PROPOFOL 25 MG: 10 INJECTION, EMULSION INTRAVENOUS at 14:54

## 2022-04-28 RX ADMIN — LIDOCAINE HYDROCHLORIDE 100 MG: 20 INJECTION, SOLUTION EPIDURAL; INFILTRATION; INTRACAUDAL; PERINEURAL at 14:45

## 2022-04-28 RX ADMIN — HYDROMORPHONE HYDROCHLORIDE 0.5 MG: 1 INJECTION, SOLUTION INTRAMUSCULAR; INTRAVENOUS; SUBCUTANEOUS at 22:20

## 2022-04-28 RX ADMIN — ONDANSETRON 4 MG: 2 INJECTION INTRAMUSCULAR; INTRAVENOUS at 06:53

## 2022-04-28 RX ADMIN — PROPOFOL 25 MG: 10 INJECTION, EMULSION INTRAVENOUS at 15:06

## 2022-04-28 RX ADMIN — SODIUM CHLORIDE, PRESERVATIVE FREE 10 ML: 5 INJECTION INTRAVENOUS at 13:15

## 2022-04-28 RX ADMIN — DIPHENHYDRAMINE HYDROCHLORIDE 25 MG: 50 INJECTION, SOLUTION INTRAMUSCULAR; INTRAVENOUS at 14:44

## 2022-04-28 RX ADMIN — SODIUM CHLORIDE, POTASSIUM CHLORIDE, SODIUM LACTATE AND CALCIUM CHLORIDE 125 ML/HR: 600; 310; 30; 20 INJECTION, SOLUTION INTRAVENOUS at 09:43

## 2022-04-28 RX ADMIN — PROPOFOL 25 MG: 10 INJECTION, EMULSION INTRAVENOUS at 14:59

## 2022-04-28 RX ADMIN — MIDAZOLAM HYDROCHLORIDE 2 MG: 1 INJECTION, SOLUTION INTRAMUSCULAR; INTRAVENOUS at 15:03

## 2022-04-28 RX ADMIN — SODIUM CHLORIDE: 900 INJECTION, SOLUTION INTRAVENOUS at 14:24

## 2022-04-28 RX ADMIN — PANTOPRAZOLE SODIUM 40 MG: 40 INJECTION, POWDER, FOR SOLUTION INTRAVENOUS at 22:11

## 2022-04-28 RX ADMIN — SODIUM CHLORIDE, POTASSIUM CHLORIDE, SODIUM LACTATE AND CALCIUM CHLORIDE 125 ML/HR: 600; 310; 30; 20 INJECTION, SOLUTION INTRAVENOUS at 18:01

## 2022-04-28 NOTE — ANESTHESIA PREPROCEDURE EVALUATION
Relevant Problems   RESPIRATORY SYSTEM   (+) H/O Asthma, Mild Intermittent      NEUROLOGY   (+) H/O Migraines      CARDIOVASCULAR   (+) H/O PVCs (premature ventricular contractions)       Anesthetic History   No history of anesthetic complications            Review of Systems / Medical History  Patient summary reviewed, nursing notes reviewed and pertinent labs reviewed    Pulmonary  Within defined limits          Asthma        Neuro/Psych   Within defined limits      Psychiatric history     Cardiovascular  Within defined limits          Dysrhythmias            GI/Hepatic/Renal  Within defined limits              Endo/Other  Within defined limits      Anemia     Other Findings              Physical Exam    Airway  Mallampati: II  TM Distance: > 6 cm  Neck ROM: normal range of motion   Mouth opening: Normal     Cardiovascular  Regular rate and rhythm,  S1 and S2 normal,  no murmur, click, rub, or gallop             Dental  No notable dental hx       Pulmonary  Breath sounds clear to auscultation               Abdominal  GI exam deferred       Other Findings            Anesthetic Plan    ASA: 2  Anesthesia type: MAC            Anesthetic plan and risks discussed with: Patient

## 2022-04-28 NOTE — ROUTINE PROCESS
Ne Singh  1955  [id number]    Situation:  Verbal report received from: LUIS MIGUEL Stevens RN  Procedure: Procedure(s):  ESOPHAGOGASTRODUODENOSCOPY (EGD)  ENDOSCOPIC ULTRASOUND (EUS)    Background:    Preoperative diagnosis: Epigastric pain  Postoperative diagnosis: Normal EGD  Gallbladder Stones/Sludge  Diverticulum    :  Dr. Cyrus Garcia  Assistant(s): Endoscopy Technician-1: Ajay Villeda  Endoscopy RN-1: Magalis Fishman RN    Specimens: * No specimens in log *  H. Pylori  no    Assessment:  Intra-procedure medications   Anesthesia gave intra-procedure sedation and medications, see anesthesia flow sheet yes    Intravenous fluids: NS@ KVO     Vital signs stable     Abdominal assessment: round and soft     Recommendation:  .   Return to floor

## 2022-04-28 NOTE — PERIOP NOTES

## 2022-04-28 NOTE — PROCEDURES
[address]  [address]       Endoscopic Ultrasound    NAME:  Bertha Gallagher   :   1955   MRN:   [id number]       Procedure Type: Endoscopic Ultrasound    Indications: acute pancreatitis of unclear cause, dilated CBD    Pre-operative Diagnosis: see indication above    Post-operative Diagnosis:  See findings below    : Boyd Duque MD    Surgical Assistant: [unfilled]    Implants:  None    Referring Provider: Marnie Raymundo MD    Procedure Details:      Anethesia/Sedation:  MAC anesthesia Propofol      Procedure Details   After infom consent was obtained for the procedure, with all risks and benefits of procedure explained the patient was taken to the endoscopy suite and placed in the left lateral decubitus position. Following sequential administration of sedation as per above, the egd then radial  echoendoscope were inserted into the mouth and advanced under direct vision to third portion of the duodenum. A careful inspection was made as the gastroscope was withdrawn, including a retroflexed view of the proximal stomach; findings and interventions are described below.    Findings:     Endoscopic:   Esophagus:normal   Stomach: normal    Duodenum: there was large diverticulum in second portion of DU, measured around 3 x 4 cm , it was normal looking, no food impacted, the ampulla was normal looking and located in diverticulum     Ultrasound:   Esophagus: normal findings   Stomach: normal findings   Pancreas:     Areas examined: the entire gland    Parenchyma: -diffuse changes consistent with pancreatitis, no mass seen but of note that it is difficult to see on EUS small pancreatic neoplasm in the setting of pancreatitis      Pancreatic Duct: normal findings   Liver:     Parenchyma: normal examined portion of liver    Gallbladder: sludge, and small stones    Bile Duct: the entire biliary tree, ranging in diameter between 4 and 8 mm, it was normal, no mass, no stones, no sludge seen , no stricture               Lymph Node: no adenopathy         Specimen Removed:  None    Complications: None. EBL:  None.     Interventions: none    Recommendations:   NPO  Suspect her acute pancreatitis is secondary to passing sludge or small stones   Consult Dr Pop Ta to discuss risks / benefits and timing of  lap cholecystectomy   Dr Hines Alpers will follow in am     Signed By: Brianne Hardy MD     4/28/2022  3:14 PM

## 2022-04-28 NOTE — PROGRESS NOTES
Medicare Outpatient Observation Notice (MOON) provided to patient/representative with verbal explanation of the notice. Time allotted for questions regarding the notice. Patient /representative provided a completed copy of the MOON notice. Copy placed on bedside chart. Kvng Orourke, Care Management Assistant.

## 2022-04-28 NOTE — CONSULTS
[facility]  Inpatient Consultation        Admit Date: 4/27/2022  Reason for Consultation: gallstone pancreatitis    HPI:  Christine Kruger is a 74 y.o. female whom we are asked to see in consultation by Dr. Perri Montelongo for gallstone pancreatitis. Pt presented to ED yesterday morning with c/o severe, sharp, non-radiating epigastric pain that began after she ate breakfast and has been associated with nausea and vomiting. She has been NPO since yesterday morning. Reports her last BM was yesterday morning and it was normal constancy and color. Denies any dark or bloody stool or hematemesis. Denies Fever or chills. ABD sx history includes MARIE and exploratory lap. Significant labs include: WBC 16.9, lipase >3,000, AST 53, T. Bili nl  See findings from imaging below. Endoscopic ultrasound findings 4/28/22     Esophagus:normal              Stomach: normal               Duodenum: there was large diverticulum in second portion of DU, measured around 3 x 4 cm , it was normal looking, no food impacted, the ampulla was normal looking and located in diverticulum   Ultrasound:              Esophagus: normal findings              Stomach: normal findings              Pancreas:                           Areas examined: the entire gland                          Parenchyma: -diffuse changes consistent with pancreatitis, no mass seen but of note that it is difficult to see on EUS small pancreatic neoplasm in the setting of pancreatitis                          Pancreatic Duct: normal findings              Liver:                           Parenchyma: normal examined portion of liver                          Gallbladder: sludge, and small stones                          Bile Duct: the entire biliary tree, ranging in diameter between 4 and 8 mm, it was normal, no mass, no stones, no sludge seen , no stricture                          Lymph Node: no adenopathy        MRCP 4/27/22  1. Predominantly extrahepatic biliary dilatation with pancreatic ductal  dilatation. No definite filling defects are identified within the common bile  duct. The etiology is not completely clear. There is possible thickening of the  second portion the duodenum with focal outpouching which more likely represents  a duodenal diverticulum which may be contributing to the patient's obstructive  changes. Given the duodenal mural thickening, duodenal ulcer disease or other  processes are not excluded. 2.  Acute pancreatitis involving the tail the pancreas. 3.  Gallbladder wall thickening which may be secondary to biliary obstruction. Acute cholecystitis is not excluded however no stones are identified. CT abd/ pelvis 4/27/22  1. Nonspecific thickening of the second duodenal wall, with wall thickening and  irregular fluid-filled lumen but no abscess or free air. Consider peptic  disease/ulcer. Endoscopic correlation may be helpful. 2. Minimal gallbladder sludge but otherwise unremarkable gallbladder and biliary  ductal system.   3. Other incidentals and postoperative changes      Patient Active Problem List    Diagnosis Date Noted    Acute pancreatitis 04/27/2022    Osteoporosis 02/14/2014    Screening exams for skin cancer 01/21/2014    Cystocele & Rectocele 04/10/2012    Vitamin D deficiency 04/10/2012    H/O Right groin pain r/t labral tear, 2002 04/10/2012    Incisional hernia at hysterectomy scar 04/10/2012    H/O PVCs (premature ventricular contractions) 04/10/2012    Perennial allergic rhinitis 05/11/2011    H/O Asthma, Mild Intermittent 05/11/2011    Chronic insomnia 05/11/2011    Claustrophobia 05/11/2011    Anxiety & Panic Attacks r/t flying, travel, claustrophobia 05/11/2011    Fracture of left foot, 2nd, 3rd, 4th Metatarsals 6/2008 05/11/2011    Left ankle sprain 5/2009 05/11/2011    Chronic Left Ankle Swelling 05/11/2011    History of severe iron deficiency anemia r/t menorrhagia, s/p hysterectomy 05/11/2011    H/O Migraines 05/10/2011     Past Medical History:   Diagnosis Date    Anemia NEC     Anxiety & Panic Attacks r/t flying, travel, claustrophobia 5/11/2011    Asthma, Mild Intermittent 5/11/2011    Cancer ([facility] [address]     skin/left hand    Cancer ([facility] [address]     skin /left hand    Chronic insomnia 5/11/2011    Chronic Left Ankle Swelling 5/11/2011    Claustrophobia 5/11/2011    Contact dermatitis and other eczema, due to unspecified cause     Cystocele & Rectocele 4/10/2012    Fracture of left foot, 2nd, 3rd, 4th Metatarsals 6/2008 5/11/2011    H/O Migraines 5/10/2011    H/O PVCs (premature ventricular contractions) 4/10/2012    H/O Right groin pain r/t labral tear, 2002 4/10/2012    History of severe iron deficiency anemia r/t menorrhagia, s/p hysterectomy 5/11/2011    Incisional hernia at hysterectomy scar 4/10/2012    Left ankle sprain 5/2009 5/11/2011    Osteoporosis 2/14/2014    Perennial allergic rhinitis 5/11/2011    Screening exams for skin cancer 1/21/2014    Derm Dr Will Wing; AK's, [facility]    Vitamin D deficiency 01-      Past Surgical History:   Procedure Laterality Date    BONE MARROW ASPIRATE &BIOPSY  1979    iron def anemia    COLONOSCOPY  6/2012    Benign hyperplaastic polyp June 18, 2012 per Dr Didi Weaver; f/u 10 yrs    HX MALIGNANT SKIN LESION EXCISION  5/2011, repeat 6/2011    SCC excised left hand; Dr Deni Rodriguez ORTHOPAEDIC  1/2013    Right foot surgery, Dr Tanya Perales and Endometriosis    HX PELVIC LAPAROSCOPY  1987    HX SALPINGO-OOPHORECTOMY  1987    RSO; Endometriosis & thrombosis of right tube    US ASPIRATION BREAST      Right breast, Dr Goff Hair; multiple      Social History     Tobacco Use    Smoking status: Never Smoker    Smokeless tobacco: Never Used   Substance Use Topics    Alcohol use: Yes     Comment: just very occasional      Family History   Problem Relation Age of Onset    Lung Disease Mother     Heart Disease Mother     Breast Cancer Mother 78        diagnosed 78 2011    Cancer Father     Breast Cancer Maternal Aunt         diagnosed in her late 29's, survived it after mastectomy;  natural causes 80    Hypertension Mother     Stroke Mother 68      Prior to Admission medications    Medication Sig Start Date End Date Taking? Authorizing Provider   denosumab (Prolia) 60 mg/mL injection 60 mg by SubCUTAneous route once. 2019   Yes Provider, Historical   multivitamin (ONE A DAY) tablet Take 1 Tab by mouth daily. Includes 1000 units VITAMIN D   Yes Provider, Historical   cetirizine (ZYRTEC) 10 mg tablet Take 10 mg by mouth daily. Yes Provider, Historical   alendronate (FOSAMAX) 70 mg tablet TAKE 1 TABLET EVERY   Patient not taking: Reported on 2022 3/1/15   Bubba Chou MD   alprazolam Doralee Katy) 0.5 mg tablet Take 1 tablet by mouth every eight (8) hours as needed for Anxiety (related to flying or claustrophobia).   Patient not taking: Reported on 2022 11/3/14   Marisol Dumont MD     Current Facility-Administered Medications   Medication Dose Route Frequency    benzocaine-menthoL (CEPACOL) lozenge 1 Lozenge  1 Lozenge Mucous Membrane PRN    lactated Ringers infusion  125 mL/hr IntraVENous CONTINUOUS    prochlorperazine (COMPAZINE) with saline injection 5 mg  5 mg IntraVENous Q6H PRN    0.9% sodium chloride infusion  100 mL/hr IntraVENous CONTINUOUS    sodium chloride (NS) flush 5-40 mL  5-40 mL IntraVENous Q8H    sodium chloride (NS) flush 5-40 mL  5-40 mL IntraVENous PRN    cetirizine (ZYRTEC) tablet 10 mg  10 mg Oral DAILY    ALPRAZolam (XANAX) tablet 0.5 mg  0.5 mg Oral Q8H PRN    sodium chloride (NS) flush 5-40 mL  5-40 mL IntraVENous Q8H    sodium chloride (NS) flush 5-40 mL  5-40 mL IntraVENous PRN    acetaminophen (TYLENOL) tablet 650 mg  650 mg Oral Q6H PRN    Or    acetaminophen (TYLENOL) suppository 650 mg  650 mg Rectal Q6H PRN    polyethylene glycol (MIRALAX) packet 17 g  17 g Oral DAILY PRN    ondansetron (ZOFRAN) injection 4 mg  4 mg IntraVENous Q6H PRN    enoxaparin (LOVENOX) injection 40 mg  40 mg SubCUTAneous DAILY    HYDROmorphone (DILAUDID) injection 0.5 mg  0.5 mg IntraVENous Q4H PRN    pantoprazole (PROTONIX) injection 40 mg  40 mg IntraVENous Q12H    And    sodium chloride (NS) flush 10 mL  10 mL IntraVENous Q12H     No Known Allergies       Subjective:     Review of Systems:    A comprehensive review of systems was negative except for that written in the History of Present Illness. Objective:     Blood pressure 117/72, pulse 85, temperature 97.5 °F (36.4 °C), resp. rate 16, height 5' 2\" (1.575 m), weight 145 lb (65.8 kg), SpO2 95 %. Temp (24hrs), Av.5 °F (36.9 °C), Min:97.5 °F (36.4 °C), Max:100 °F (37.8 °C)      Recent Labs     22  0036 22  1241   WBC 16.9* 20.9*   HGB 12.8 13.9   HCT 40.1 42.9    208     Recent Labs     22  0036 22  1241    140   K 4.3 4.0   * 109*   CO2 25 23   * 140*   BUN 13 15   CREA 0.80 0.99   CA 8.3* 9.3   ALB 3.6 4.1   TBILI 0.8 0.9   ALT 70 94*     Recent Labs     22  0036 22  1241   AML  --  >1,300*   LPSE >3,000* >3,000*         Intake/Output Summary (Last 24 hours) at 2022 1617  Last data filed at 2022 1510  Gross per 24 hour   Intake 640 ml   Output    Net 640 ml     Date 22 0700 - 22 0659   Shift 8655-7456 4093-2259 3437-6353 24 Hour Total   INTAKE   P.O. 240   240   I. V.(mL/kg/hr)  400  400   Shift Total(mL/kg) 240(3.6) 400(6.1)  640(9.7)   OUTPUT   Shift Total(mL/kg)       Weight (kg) 65.8 65.8 65.8 65.8     _____________________  Physical Exam:     General:  Alert, cooperative, no distress, appears stated age. Eyes:   Sclera clear. Throat: Lips, mucosa, and tongue normal.   Neck: Supple, symmetrical, trachea midline. Lungs:   Clear to auscultation bilaterally.    Heart: Regular rate and rhythm. Abdomen:   Normal BS, Soft, epigastric and RUQ TTP,  No masses,  No organomegaly. Extremities: Extremities normal, atraumatic, no cyanosis or edema. Skin: Skin color, texture, turgor normal. No rashes or lesions. Assessment:   Active Problems:    Acute pancreatitis (4/27/2022)      74year old female with gallstone pancreatitis     Plan:   Plan for laparoscopic cholecystectomy and intra-operative cholangiogram on Monday with Dr. June Riley   Pt may have sips of clear liquids as tolerated   IV hydration  PPI  DVT proph  Daily labs   Anti-emetics and Analgesics PRN    Thank you for allowing us to participate in the care of this patient. Total time spent with patient: 30 minutes. Signed By: Rebekah Cummings NP     April 28, 2022      Patient seen and examined  Agree with above  Sudden onset of abdominal pain upper abdomen with nausea vomiting. Lipase greater than 3000. Does not really drink that much alcohol. EUS performed showed duodenal diverticulum. Gallbladder with small sludge and stones. No CBD stones. Consistent with what seen on MRCP  General alert no acute distress  Lungs clear bilaterally  Heart regular rate rhythm  Abdomen soft with epigastric tenderness  Assessment  Gallstone pancreatitis  Would recommend laparoscopic cholecystectomy prior to discharge. Plan for this with Dr. June Riley probably on Monday.

## 2022-04-28 NOTE — PERIOP NOTES
TRANSFER - IN REPORT:    Verbal report received from SHERIE Garcia (name) on Paco Mccormick  being received from [phone] [id number] (unit) for ordered procedure      Information from the following report(s) JAQUIAR, MAR, Pre Procedure Checklist, Procedure Verification and Quality Measures was reviewed with the receiving nurse. Opportunity for questions and clarification was provided.

## 2022-04-28 NOTE — PROGRESS NOTES
Transition of Care Plan  RUR N/A    GI consulted  EGD today  MRI/MRCP pending     Disposition    [address]   Will arrange if ordered     Medical follow Up   PCP and specialist    Contact   Gary Mazariegos  [phone]    Reason for Admission:    Acute Pancreatitis   Hx of migraines, asthma, osteoporsis                   RUR Score:    N/A                 Plan for utilizing home health: Will arrange if ordered    PCP: First and Last name:  Carin De Souza MD     Name of Practice:    Are you a current patient: Yes/No:   yes   Approximate date of last visit:  More than 6 month   Can you participate in a virtual visit with your PCP: no                    Current Advanced Directive/Advance Care Plan: Full Code      Healthcare Decision Maker:   Click here to complete [place] including selection of the Healthcare Decision Maker Relationship (ie \"Primary\")                           Transition of Care Plan:     Home with  and medical follow up    Cm met with patient and  in patient's room to introduce self and explain role. Patient was alert and oriented-  Confirmed demographics, PCP and insurance. Secures medications at [address] [place] [address] Drive  And [facility]. Patient was self care and independent prior to admission-- uses no dme  HH no Hx  Rehab/Snf   No Hx       Patient lives in two level home with her . She drives and independent with adl's and iadl's. The couple have two adult son-- they do not live close-- one is in [place] and the other in [place]. Patient plans to return home when discharged.  will transport. CM to follow for transition of care needs and will assist if services are ordered. Care Management Interventions  PCP Verified by CM: Yes  Mode of Transport at Discharge: Other (see comment) (car)  Transition of Care Consult (CM Consult):  Other  Discharge Durable Medical Equipment: No  Physical Therapy Consult: No  Occupational Therapy Consult: No  Speech Therapy Consult: No  Support Systems: Spouse/Significant Other,Child(yousuf)  Confirm Follow Up Transport: Family (self)  Discharge Location  Patient Expects to be Discharged to[de-identified] Home

## 2022-04-28 NOTE — PERIOP NOTES
TRANSFER - OUT REPORT:    Verbal report given to SHERIE Zurita (name) on Rl Tariq  being transferred to  [id number] (unit) for routine progression of care       Information from the following report(s) Procedure Summary, MAR and Recent Results was reviewed with the receiving nurse. Lines:   Peripheral IV 04/27/22 Left Antecubital (Active)   Site Assessment Clean, dry, & intact 04/28/22 0023   Phlebitis Assessment 0 04/28/22 0023   Infiltration Assessment 0 04/28/22 0023   Dressing Status Clean, dry, & intact 04/28/22 0023   Dressing Type Transparent 04/28/22 0023   Hub Color/Line Status Pink;Flushed; Infusing 04/28/22 0023   Action Taken Blood drawn 04/27/22 1240   Alcohol Cap Used Yes 04/28/22 0023        Opportunity for questions and clarification was provided.

## 2022-04-28 NOTE — PROGRESS NOTES
Problem: Falls - Risk of  Goal: *Absence of Falls  Description: Document Marciana Distance Fall Risk and appropriate interventions in the flowsheet.   Outcome: Progressing Towards Goal  Note: Fall Risk Interventions:            Medication Interventions: Evaluate medications/consider consulting pharmacy                   Problem: Patient Education: Go to Patient Education Activity  Goal: Patient/Family Education  Outcome: Progressing Towards Goal

## 2022-04-28 NOTE — PROGRESS NOTES
[facility]  Hospitalist Group                                                                                          Hospitalist Progress Note  Milka Curtis NP  Answering service: [phone] OR 36 from in house phone        Date of Service:  2022  NAME:  Dhaval Lee  :  1955  MRN:  [id number]      Admission Summary:   Per H&P, This is a 25-year-old female with past medical history of anxiety, anemia. She comes over here because of abdominal pain that started this morning. The patient says that this morning when she woke up, she was perfectly at her baseline. She started having breakfast and soon after she started having epigastric abdominal pain. She states that the pain was about 10/10 in intensity, sharp, nonradiating in the epigastric region associated with nausea and vomiting. In fact, she vomited about 4-5 times. At no point of time did she vomit any blood. She did not have any diarrhea. No fever, cough, headache, dizziness, and no changes in bowel movements. Interval history / Subjective:   I saw the patient this morning on rounds. With epigastric tenderness and nausea however had just received antiemetics.  was at the bedside at the moment of the encounter, he was updated at that time. Assessment & Plan:     Acute pancreatitis  Her leukocytosis is improving, was 20.9 yesterday, down to 16.9 today  LFTs are stable  Lipase remains elevated, greater than 3000  Gastroenterology following, I did discuss the case with gastroenterology today, plan for scope  MRI/MRCP yesterday with extrahepatic biliary dilatation with pancreatic duct dilatation.   Findings also consistent with acute pancreatitis  Gallbladder wall thickening also noted  GI following  Continuing IV hydration in the form of LR  Continuing analgesics  Continuing pantoprazole  For EGD with EUS today    Seasonal allergies  Stable  Continuing cetirizine    Anxiety  She does have alprazolam listed on her home medication list, I did review the , she does not have any active prescriptions for this  Will have some alprazolam available inpatient if she needs it      Code status: FULL  DVT prophylaxis: LMWH    Care Plan discussed with: Patient/Family, Nurse and Consultant GI  Anticipated Disposition: Home w/Family  Anticipated Discharge: Greater than 48 hours     Hospital Problems  Date Reviewed: 3/1/2015          Codes Class Noted POA    Acute pancreatitis ICD-10-CM: K85.90  ICD-9-CM: 577.0  4/27/2022 Unknown                Review of Systems:   A comprehensive review of systems was negative except for that written in the HPI. Vital Signs:    Last 24hrs VS reviewed since prior progress note. Most recent are:  Visit Vitals  /64 (BP 1 Location: Right arm, BP Patient Position: At rest)   Pulse 80   Temp 97.9 °F (36.6 °C)   Resp 18   Ht 5' 2\" (1.575 m)   Wt 65.8 kg (145 lb)   SpO2 96%   BMI 26.52 kg/m²         Intake/Output Summary (Last 24 hours) at 4/28/2022 1235  Last data filed at 4/28/2022 0859  Gross per 24 hour   Intake 240 ml   Output    Net 240 ml        Physical Examination:     I had a face to face encounter with this patient and independently examined them on 4/28/2022 as outlined below:          Constitutional:  No acute distress, cooperative, pleasant    ENT:  Oral mucosa moist, oropharynx benign. Resp:  CTA bilaterally. No wheezing/rhonchi/rales. No accessory muscle use   CV:  Regular rhythm, normal rate, no murmurs, gallops, rubs    GI:  Soft, non distended, non tender. normoactive bowel sounds, no hepatosplenomegaly     Musculoskeletal:  No edema, warm, 2+ pulses throughout    Neurologic:  Moves all extremities.   AAOx3, CN II-XII reviewed            Data Review:    Review and/or order of clinical lab test  Review and/or order of tests in the radiology section of CPT  I personally reviewed  Image      Labs:     Recent Labs     04/28/22  0036 04/27/22  1241   WBC 16.9* 20.9*   HGB 12.8 13.9   HCT 40.1 42.9    208     Recent Labs     04/28/22  0036 04/27/22  1241    140   K 4.3 4.0   * 109*   CO2 25 23   BUN 13 15   CREA 0.80 0.99   * 140*   CA 8.3* 9.3     Recent Labs     04/28/22  0036 04/27/22  1241   ALT 70 94*   AP 78 98   TBILI 0.8 0.9   TP 6.4 7.3   ALB 3.6 4.1   GLOB 2.8 3.2   AML  --  >1,300*   LPSE >3,000* >3,000*     No results for input(s): INR, PTP, APTT, INREXT in the last 72 hours. No results for input(s): FE, TIBC, PSAT, FERR in the last 72 hours. No results found for: FOL, RBCF   No results for input(s): PH, PCO2, PO2 in the last 72 hours. No results for input(s): CPK, CKNDX, TROIQ in the last 72 hours.     No lab exists for component: CPKMB  Lab Results   Component Value Date/Time    Cholesterol, total 222 (H) 01/21/2014 09:46 AM    HDL Cholesterol 111 01/21/2014 09:46 AM    LDL, calculated 98 01/21/2014 09:46 AM    Triglyceride 63 01/21/2014 09:46 AM     No results found for: GLUCPOC  Lab Results   Component Value Date/Time    Color DARK YELLOW 04/27/2022 12:41 PM    Appearance CLEAR 04/27/2022 12:41 PM    Specific gravity 1.023 04/27/2022 12:41 PM    pH (UA) 6.0 04/27/2022 12:41 PM    Protein TRACE (A) 04/27/2022 12:41 PM    Glucose Negative 04/27/2022 12:41 PM    Ketone TRACE (A) 04/27/2022 12:41 PM    Bilirubin Negative 04/27/2022 12:41 PM    Urobilinogen 1.0 04/27/2022 12:41 PM    Nitrites Negative 04/27/2022 12:41 PM    Leukocyte Esterase TRACE (A) 04/27/2022 12:41 PM    Epithelial cells FEW 04/27/2022 12:41 PM    Bacteria Negative 04/27/2022 12:41 PM    WBC 0-4 04/27/2022 12:41 PM    RBC 0-5 04/27/2022 12:41 PM         Medications Reviewed:     Current Facility-Administered Medications   Medication Dose Route Frequency    benzocaine-menthoL (CEPACOL) lozenge 1 Lozenge  1 Lozenge Mucous Membrane PRN    lactated Ringers infusion  125 mL/hr IntraVENous CONTINUOUS    prochlorperazine (COMPAZINE) with saline injection 5 mg 5 mg IntraVENous Q6H PRN    cetirizine (ZYRTEC) tablet 10 mg  10 mg Oral DAILY    ALPRAZolam (XANAX) tablet 0.5 mg  0.5 mg Oral Q8H PRN    sodium chloride (NS) flush 5-40 mL  5-40 mL IntraVENous Q8H    sodium chloride (NS) flush 5-40 mL  5-40 mL IntraVENous PRN    acetaminophen (TYLENOL) tablet 650 mg  650 mg Oral Q6H PRN    Or    acetaminophen (TYLENOL) suppository 650 mg  650 mg Rectal Q6H PRN    polyethylene glycol (MIRALAX) packet 17 g  17 g Oral DAILY PRN    ondansetron (ZOFRAN) injection 4 mg  4 mg IntraVENous Q6H PRN    enoxaparin (LOVENOX) injection 40 mg  40 mg SubCUTAneous DAILY    HYDROmorphone (DILAUDID) injection 0.5 mg  0.5 mg IntraVENous Q4H PRN    pantoprazole (PROTONIX) injection 40 mg  40 mg IntraVENous Q12H    And    sodium chloride (NS) flush 10 mL  10 mL IntraVENous Q12H     ______________________________________________________________________  EXPECTED LENGTH OF STAY: - - -  ACTUAL LENGTH OF STAY:          0                 Alonso Palm NP

## 2022-04-28 NOTE — PROGRESS NOTES
Problem: Falls - Risk of  Goal: *Absence of Falls  Description: Document Lupe Abigail Fall Risk and appropriate interventions in the flowsheet.   Outcome: Progressing Towards Goal  Note: Fall Risk Interventions:

## 2022-04-28 NOTE — ANESTHESIA POSTPROCEDURE EVALUATION
Procedure(s):  ESOPHAGOGASTRODUODENOSCOPY (EGD)  ENDOSCOPIC ULTRASOUND (EUS). MAC    Anesthesia Post Evaluation      Multimodal analgesia: multimodal analgesia not used between 6 hours prior to anesthesia start to PACU discharge  Patient location during evaluation: PACU  Patient participation: complete - patient participated  Level of consciousness: awake  Pain score: 0  Pain management: adequate  Airway patency: patent  Anesthetic complications: no  Cardiovascular status: acceptable  Respiratory status: acceptable  Hydration status: acceptable  Comments: I have evaluated the patient and meets criteria for discharge from PACU. Nathalia King MD    Final Post Anesthesia Temperature Assessment:  Normothermia (36.0-37.5 degrees C)      INITIAL Post-op Vital signs:   Vitals Value Taken Time   /47 04/28/22 1522   Temp     Pulse 87 04/28/22 1526   Resp 28 04/28/22 1526   SpO2 97 % 04/28/22 1526   Vitals shown include unvalidated device data.

## 2022-04-28 NOTE — PROGRESS NOTES
[address]  [address] [facility], [address]       GI PROGRESS NOTE  Will Jenniffer Blake  [phone] office  [phone] NP/PA in-hospital cell phone M-F until 4:30PM  After 5PM or on weekends, please call  for physician on call      NAME: Denzel Duenas   :  1955   MRN:  [id number]       Subjective:   Patient reports persistent nausea with vomiting. She reports some abdominal pain. She is NPO.  MRI/MRCP was done yesterday.  is at the bedside. Objective:     VITALS:   Last 24hrs VS reviewed since prior progress note. Most recent are:  Visit Vitals  /64 (BP 1 Location: Right arm, BP Patient Position: At rest)   Pulse 80   Temp 97.9 °F (36.6 °C)   Resp 18   Ht 5' 2\" (1.575 m)   Wt 65.8 kg (145 lb)   SpO2 96%   BMI 26.52 kg/m²     PHYSICAL EXAM:  General: Cooperative, no acute distress    Neurologic:  Alert and oriented X 3  HEENT: EOMI, no scleral icterus   Lungs:  No acute respiratory distress  Heart:  S1 S2  Abdomen: Soft, non-distended, epigastric tenderness, no guarding, no rebound. +Bowel sounds. Extremities: Warm  Psych:   Good insight. Not anxious or agitated.     Lab Data Reviewed:     Recent Results (from the past 24 hour(s))   EKG, 12 LEAD, INITIAL    Collection Time: 22 11:59 AM   Result Value Ref Range    Ventricular Rate 103 BPM    Atrial Rate 103 BPM    P-R Interval 146 ms    QRS Duration 66 ms    Q-T Interval 360 ms    QTC Calculation (Bezet) 471 ms    Calculated P Axis 75 degrees    Calculated R Axis 57 degrees    Calculated T Axis -39 degrees    Diagnosis       Sinus tachycardia  Nonspecific T wave abnormality  Abnormal ECG  No previous ECGs available  Confirmed by Pepper Handler ([id number]) on 2022 10:29:27 PM     CBC WITH AUTOMATED DIFF    Collection Time: 22 12:41 PM   Result Value Ref Range    WBC 20.9 (H) 3.6 - 11.0 K/uL    RBC 4.55 3.80 - 5.20 M/uL    HGB 13.9 11.5 - 16.0 g/dL    HCT 42.9 35.0 - 47.0 %    MCV 94.3 80.0 - 99.0 FL    MCH 30.5 26.0 - 34.0 PG    MCHC 32.4 30.0 - 36.5 g/dL    RDW 12.7 11.5 - 14.5 %    PLATELET 281 424 - 780 K/uL    MPV 10.6 8.9 - 12.9 FL    NRBC 0.0 0  WBC    ABSOLUTE NRBC 0.00 0.00 - 0.01 K/uL    NEUTROPHILS 90 (H) 32 - 75 %    LYMPHOCYTES 3 (L) 12 - 49 %    MONOCYTES 6 5 - 13 %    EOSINOPHILS 0 0 - 7 %    BASOPHILS 0 0 - 1 %    IMMATURE GRANULOCYTES 1 (H) 0.0 - 0.5 %    ABS. NEUTROPHILS 18.8 (H) 1.8 - 8.0 K/UL    ABS. LYMPHOCYTES 0.6 (L) 0.8 - 3.5 K/UL    ABS. MONOCYTES 1.3 (H) 0.0 - 1.0 K/UL    ABS. EOSINOPHILS 0.0 0.0 - 0.4 K/UL    ABS. BASOPHILS 0.0 0.0 - 0.1 K/UL    ABS. IMM. GRANS. 0.2 (H) 0.00 - 0.04 K/UL    DF SMEAR SCANNED      RBC COMMENTS NORMOCYTIC, NORMOCHROMIC     METABOLIC PANEL, COMPREHENSIVE    Collection Time: 04/27/22 12:41 PM   Result Value Ref Range    Sodium 140 136 - 145 mmol/L    Potassium 4.0 3.5 - 5.1 mmol/L    Chloride 109 (H) 97 - 108 mmol/L    CO2 23 21 - 32 mmol/L    Anion gap 8 5 - 15 mmol/L    Glucose 140 (H) 65 - 100 mg/dL    BUN 15 6 - 20 MG/DL    Creatinine 0.99 0.55 - 1.02 MG/DL    BUN/Creatinine ratio 15 12 - 20      GFR est AA >60 >60 ml/min/1.73m2    GFR est non-AA 56 (L) >60 ml/min/1.73m2    Calcium 9.3 8.5 - 10.1 MG/DL    Bilirubin, total 0.9 0.2 - 1.0 MG/DL    ALT (SGPT) 94 (H) 12 - 78 U/L    AST (SGOT) 140 (H) 15 - 37 U/L    Alk. phosphatase 98 45 - 117 U/L    Protein, total 7.3 6.4 - 8.2 g/dL    Albumin 4.1 3.5 - 5.0 g/dL    Globulin 3.2 2.0 - 4.0 g/dL    A-G Ratio 1.3 1.1 - 2.2     SAMPLES BEING HELD    Collection Time: 04/27/22 12:41 PM   Result Value Ref Range    SAMPLES BEING HELD 1RED     COMMENT        Add-on orders for these samples will be processed based on acceptable specimen integrity and analyte stability, which may vary by analyte.    TROPONIN-HIGH SENSITIVITY    Collection Time: 04/27/22 12:41 PM   Result Value Ref Range    Troponin-High Sensitivity 13 0 - 51 ng/L   LIPASE    Collection Time: 04/27/22 12:41 PM   Result Value Ref Range    Lipase >3,000 (H) 73 - 393 U/L   URINALYSIS W/MICROSCOPIC    Collection Time: 04/27/22 12:41 PM   Result Value Ref Range    Color DARK YELLOW      Appearance CLEAR CLEAR      Specific gravity 1.023 1.003 - 1.030      pH (UA) 6.0 5.0 - 8.0      Protein TRACE (A) NEG mg/dL    Glucose Negative NEG mg/dL    Ketone TRACE (A) NEG mg/dL    Bilirubin Negative NEG      Blood Negative NEG      Urobilinogen 1.0 0.2 - 1.0 EU/dL    Nitrites Negative NEG      Leukocyte Esterase TRACE (A) NEG      WBC 0-4 0 - 4 /hpf    RBC 0-5 0 - 5 /hpf    Epithelial cells FEW FEW /lpf    Bacteria Negative NEG /hpf    Hyaline cast 0-2 0 - 5 /lpf   URINE CULTURE HOLD SAMPLE    Collection Time: 04/27/22 12:41 PM    Specimen: Serum; Urine   Result Value Ref Range    Urine culture hold        Urine on hold in Microbiology dept for 2 days. If unpreserved urine is submitted, it cannot be used for addtional testing after 24 hours, recollection will be required. AMYLASE    Collection Time: 04/27/22 12:41 PM   Result Value Ref Range    Amylase >1,300 (H) 25 - 162 U/L   METABOLIC PANEL, COMPREHENSIVE    Collection Time: 04/28/22 12:36 AM   Result Value Ref Range    Sodium 142 136 - 145 mmol/L    Potassium 4.3 3.5 - 5.1 mmol/L    Chloride 111 (H) 97 - 108 mmol/L    CO2 25 21 - 32 mmol/L    Anion gap 6 5 - 15 mmol/L    Glucose 123 (H) 65 - 100 mg/dL    BUN 13 6 - 20 MG/DL    Creatinine 0.80 0.55 - 1.02 MG/DL    BUN/Creatinine ratio 16 12 - 20      GFR est AA >60 >60 ml/min/1.73m2    GFR est non-AA >60 >60 ml/min/1.73m2    Calcium 8.3 (L) 8.5 - 10.1 MG/DL    Bilirubin, total 0.8 0.2 - 1.0 MG/DL    ALT (SGPT) 70 12 - 78 U/L    AST (SGOT) 53 (H) 15 - 37 U/L    Alk.  phosphatase 78 45 - 117 U/L    Protein, total 6.4 6.4 - 8.2 g/dL    Albumin 3.6 3.5 - 5.0 g/dL    Globulin 2.8 2.0 - 4.0 g/dL    A-G Ratio 1.3 1.1 - 2.2     CBC WITH AUTOMATED DIFF    Collection Time: 04/28/22 12:36 AM   Result Value Ref Range    WBC 16.9 (H) 3.6 - 11.0 K/uL    RBC 4.16 3.80 - 5.20 M/uL    HGB 12.8 11.5 - 16.0 g/dL    HCT 40.1 35.0 - 47.0 %    MCV 96.4 80.0 - 99.0 FL    MCH 30.8 26.0 - 34.0 PG    MCHC 31.9 30.0 - 36.5 g/dL    RDW 13.2 11.5 - 14.5 %    PLATELET 456 146 - 754 K/uL    MPV 11.2 8.9 - 12.9 FL    NRBC 0.0 0  WBC    ABSOLUTE NRBC 0.00 0.00 - 0.01 K/uL    NEUTROPHILS 89 (H) 32 - 75 %    LYMPHOCYTES 6 (L) 12 - 49 %    MONOCYTES 4 (L) 5 - 13 %    EOSINOPHILS 0 0 - 7 %    BASOPHILS 0 0 - 1 %    IMMATURE GRANULOCYTES 1 (H) 0.0 - 0.5 %    ABS. NEUTROPHILS 15.1 (H) 1.8 - 8.0 K/UL    ABS. LYMPHOCYTES 1.0 0.8 - 3.5 K/UL    ABS. MONOCYTES 0.7 0.0 - 1.0 K/UL    ABS. EOSINOPHILS 0.0 0.0 - 0.4 K/UL    ABS. BASOPHILS 0.0 0.0 - 0.1 K/UL    ABS. IMM. GRANS. 0.1 (H) 0.00 - 0.04 K/UL    DF AUTOMATED     LIPASE    Collection Time: 04/28/22 12:36 AM   Result Value Ref Range    Lipase >3,000 (H) 73 - 393 U/L     IMPRESSION     1.  Predominantly extrahepatic biliary dilatation with pancreatic ductal  dilatation. No definite filling defects are identified within the common bile  duct. The etiology is not completely clear. There is possible thickening of the  second portion the duodenum with focal outpouching which more likely represents  a duodenal diverticulum which may be contributing to the patient's obstructive  changes. Given the duodenal mural thickening, duodenal ulcer disease or other  processes are not excluded.     2. Acute pancreatitis involving the tail the pancreas.     3.  Gallbladder wall thickening which may be secondary to biliary obstruction. Acute cholecystitis is not excluded however no stones are identified. Assessment:   · Biliary and pancreatic duct dilatation with possible duodenal thickening: MRI/MRCP as above.    · Epigastric abdominal pain with nausea/vomiting: CT abdomen/pelvis with IV contrast (4/27/22): nonspecific thickening of the second duodenal wall with wall thickening and irregular fluid-filled lumen but no abscess or free air, consider peptic disease/ulcer; minimal gallbladder sludge but otherwise unremarkable gallbladder and biliary ductal system; unremarkable pancreas. WBC 16.9 (20.9), Hgb 12.8, LFTs normal except AST 53, lipase >3,000. MRI/MRCP (4/27/22): impression above. Differential includes peptic ulcer disease, acute pancreatitis possibly secondary to biliary sludge versus obstruction, and malignancy. · Acute pancreatitis: lipase >3,000, no evidence of pancreatitis on CT scan; thickening of duodenum as above. MRI/MRCP (4/27/22) with acute pancreatitis involving the tail of the pancreas. Patient Active Problem List   Diagnosis Code    H/O Migraines G45.26    Perennial allergic rhinitis J30.89    H/O Asthma, Mild Intermittent J45.909    Chronic insomnia F51.04    Claustrophobia F40.240    Anxiety & Panic Attacks r/t flying, travel, claustrophobia F41.9    Fracture of left foot, 2nd, 3rd, 4th Metatarsals 6/2008 S92.902A    Left ankle sprain 5/2009 A99.824P    Chronic Left Ankle Swelling M25.472    History of severe iron deficiency anemia r/t menorrhagia, s/p hysterectomy Z86.2    Cystocele & Rectocele [id number]    Vitamin D deficiency E55.9    H/O Right groin pain r/t labral tear, 2002 R10.31    Incisional hernia at hysterectomy scar K43.2    H/O PVCs (premature ventricular contractions) I49.3    Screening exams for skin cancer Z12.83    Osteoporosis M81.0    Acute pancreatitis K85.90     Plan:   · NPO  · PPI BID  · LR  · Supportive measures: antiemetics and analgesics PRN  · Monitor LFTs  · MRI/MRCP with above findings which were discussed in detail with the patient and her . · Plan for EGD with EUS today with Dr. Sintia Welch. Details and risks of the procedure to include (but not limited to) anesthesia, bleeding, infection, and perforation were discussed. Patient understands and is in agreement with the plan.       Signed By: LOUIS Vázquez     4/28/2022  9:57 AM       Agree with above  EUS/EGD today, discussed risks and benefits, she was agreeable

## 2022-04-29 PROBLEM — K85.90 PANCREATITIS: Status: ACTIVE | Noted: 2022-04-29

## 2022-04-29 LAB
ALBUMIN SERPL-MCNC: 3.1 G/DL (ref 3.5–5)
ALBUMIN/GLOB SERPL: 1 {RATIO} (ref 1.1–2.2)
ALP SERPL-CCNC: 57 U/L (ref 45–117)
ALT SERPL-CCNC: 45 U/L (ref 12–78)
ANION GAP SERPL CALC-SCNC: 8 MMOL/L (ref 5–15)
AST SERPL-CCNC: 27 U/L (ref 15–37)
BASOPHILS # BLD: 0.1 K/UL (ref 0–0.1)
BASOPHILS NFR BLD: 1 % (ref 0–1)
BILIRUB SERPL-MCNC: 0.6 MG/DL (ref 0.2–1)
BUN SERPL-MCNC: 11 MG/DL (ref 6–20)
BUN/CREAT SERPL: 16 (ref 12–20)
CALCIUM SERPL-MCNC: 8.2 MG/DL (ref 8.5–10.1)
CHLORIDE SERPL-SCNC: 111 MMOL/L (ref 97–108)
CO2 SERPL-SCNC: 23 MMOL/L (ref 21–32)
CREAT SERPL-MCNC: 0.7 MG/DL (ref 0.55–1.02)
DIFFERENTIAL METHOD BLD: ABNORMAL
EOSINOPHIL # BLD: 0.1 K/UL (ref 0–0.4)
EOSINOPHIL NFR BLD: 1 % (ref 0–7)
ERYTHROCYTE [DISTWIDTH] IN BLOOD BY AUTOMATED COUNT: 13.1 % (ref 11.5–14.5)
GLOBULIN SER CALC-MCNC: 3 G/DL (ref 2–4)
GLUCOSE SERPL-MCNC: 100 MG/DL (ref 65–100)
HCT VFR BLD AUTO: 35.6 % (ref 35–47)
HGB BLD-MCNC: 11.7 G/DL (ref 11.5–16)
IMM GRANULOCYTES # BLD AUTO: 0.1 K/UL (ref 0–0.04)
IMM GRANULOCYTES NFR BLD AUTO: 1 % (ref 0–0.5)
LIPASE SERPL-CCNC: 1578 U/L (ref 73–393)
LYMPHOCYTES # BLD: 1.3 K/UL (ref 0.8–3.5)
LYMPHOCYTES NFR BLD: 12 % (ref 12–49)
MCH RBC QN AUTO: 31 PG (ref 26–34)
MCHC RBC AUTO-ENTMCNC: 32.9 G/DL (ref 30–36.5)
MCV RBC AUTO: 94.4 FL (ref 80–99)
MONOCYTES # BLD: 0.7 K/UL (ref 0–1)
MONOCYTES NFR BLD: 6 % (ref 5–13)
NEUTS SEG # BLD: 8.6 K/UL (ref 1.8–8)
NEUTS SEG NFR BLD: 79 % (ref 32–75)
NRBC # BLD: 0 K/UL (ref 0–0.01)
NRBC BLD-RTO: 0 PER 100 WBC
PLATELET # BLD AUTO: 159 K/UL (ref 150–400)
PMV BLD AUTO: 10.9 FL (ref 8.9–12.9)
POTASSIUM SERPL-SCNC: 3.4 MMOL/L (ref 3.5–5.1)
PROT SERPL-MCNC: 6.1 G/DL (ref 6.4–8.2)
RBC # BLD AUTO: 3.77 M/UL (ref 3.8–5.2)
SODIUM SERPL-SCNC: 142 MMOL/L (ref 136–145)
WBC # BLD AUTO: 10.8 K/UL (ref 3.6–11)

## 2022-04-29 PROCEDURE — 74011000250 HC RX REV CODE- 250: Performed by: HOSPITALIST

## 2022-04-29 PROCEDURE — 85025 COMPLETE CBC W/AUTO DIFF WBC: CPT

## 2022-04-29 PROCEDURE — C9113 INJ PANTOPRAZOLE SODIUM, VIA: HCPCS | Performed by: HOSPITALIST

## 2022-04-29 PROCEDURE — 96372 THER/PROPH/DIAG INJ SC/IM: CPT

## 2022-04-29 PROCEDURE — 65270000029 HC RM PRIVATE

## 2022-04-29 PROCEDURE — 83690 ASSAY OF LIPASE: CPT

## 2022-04-29 PROCEDURE — 74011250636 HC RX REV CODE- 250/636: Performed by: HOSPITALIST

## 2022-04-29 PROCEDURE — 99213 OFFICE O/P EST LOW 20 MIN: CPT | Performed by: NURSE PRACTITIONER

## 2022-04-29 PROCEDURE — 36415 COLL VENOUS BLD VENIPUNCTURE: CPT

## 2022-04-29 PROCEDURE — 96376 TX/PRO/DX INJ SAME DRUG ADON: CPT

## 2022-04-29 PROCEDURE — 80053 COMPREHEN METABOLIC PANEL: CPT

## 2022-04-29 PROCEDURE — 74011250636 HC RX REV CODE- 250/636: Performed by: NURSE PRACTITIONER

## 2022-04-29 PROCEDURE — 74011250637 HC RX REV CODE- 250/637: Performed by: HOSPITALIST

## 2022-04-29 PROCEDURE — 74011000250 HC RX REV CODE- 250: Performed by: INTERNAL MEDICINE

## 2022-04-29 PROCEDURE — 74011250637 HC RX REV CODE- 250/637: Performed by: NURSE PRACTITIONER

## 2022-04-29 PROCEDURE — G0378 HOSPITAL OBSERVATION PER HR: HCPCS

## 2022-04-29 RX ORDER — POTASSIUM CHLORIDE 750 MG/1
40 TABLET, FILM COATED, EXTENDED RELEASE ORAL
Status: COMPLETED | OUTPATIENT
Start: 2022-04-29 | End: 2022-04-29

## 2022-04-29 RX ADMIN — SODIUM CHLORIDE, PRESERVATIVE FREE 10 ML: 5 INJECTION INTRAVENOUS at 13:37

## 2022-04-29 RX ADMIN — CETIRIZINE HYDROCHLORIDE 10 MG: 10 TABLET, FILM COATED ORAL at 09:32

## 2022-04-29 RX ADMIN — PANTOPRAZOLE SODIUM 40 MG: 40 INJECTION, POWDER, FOR SOLUTION INTRAVENOUS at 09:33

## 2022-04-29 RX ADMIN — SODIUM CHLORIDE, POTASSIUM CHLORIDE, SODIUM LACTATE AND CALCIUM CHLORIDE 125 ML/HR: 600; 310; 30; 20 INJECTION, SOLUTION INTRAVENOUS at 19:02

## 2022-04-29 RX ADMIN — ENOXAPARIN SODIUM 40 MG: 100 INJECTION SUBCUTANEOUS at 09:32

## 2022-04-29 RX ADMIN — POTASSIUM CHLORIDE 40 MEQ: 750 TABLET, EXTENDED RELEASE ORAL at 09:32

## 2022-04-29 RX ADMIN — PANTOPRAZOLE SODIUM 40 MG: 40 INJECTION, POWDER, FOR SOLUTION INTRAVENOUS at 22:09

## 2022-04-29 RX ADMIN — ALPRAZOLAM 0.5 MG: 0.25 TABLET ORAL at 22:57

## 2022-04-29 RX ADMIN — SODIUM CHLORIDE, PRESERVATIVE FREE 10 ML: 5 INJECTION INTRAVENOUS at 13:38

## 2022-04-29 RX ADMIN — SODIUM CHLORIDE, POTASSIUM CHLORIDE, SODIUM LACTATE AND CALCIUM CHLORIDE 125 ML/HR: 600; 310; 30; 20 INJECTION, SOLUTION INTRAVENOUS at 13:39

## 2022-04-29 RX ADMIN — SODIUM CHLORIDE, PRESERVATIVE FREE 10 ML: 5 INJECTION INTRAVENOUS at 09:33

## 2022-04-29 RX ADMIN — SODIUM CHLORIDE, PRESERVATIVE FREE 10 ML: 5 INJECTION INTRAVENOUS at 22:09

## 2022-04-29 RX ADMIN — SODIUM CHLORIDE, POTASSIUM CHLORIDE, SODIUM LACTATE AND CALCIUM CHLORIDE 125 ML/HR: 600; 310; 30; 20 INJECTION, SOLUTION INTRAVENOUS at 03:08

## 2022-04-29 NOTE — PROGRESS NOTES
Bedside and Verbal shift change report given to Savanah Hung RN (oncoming nurse) by Mariana Anthony RN (offgoing nurse).  Report included the following information SBAR, Kardex, ED Summary, Intake/Output, MAR and Recent Results. '

## 2022-04-29 NOTE — PROGRESS NOTES
4/29/2022 -   TRANSITIONS OF CARE PLAN:   1. RUR: N/A; OBS - RRAT: 8  2. DESTINATION: Home  3. TRANSPORT: Family  4. NEEDS FOR DISCHARGE: TBD  5. ANTICIPATED FOLLOW UPS: PCP, General Surgery, GI  6. ONGOING INPATIENT NEEDS: Monitoring of Labs, Lilia Plan pending Gen. Surg.  input    Anticipated Discharge is: Greater Than 48 Hours    CRM: Ayan Amor, MPH, [address] [place] [address]; Z: [phone]

## 2022-04-29 NOTE — PROGRESS NOTES
Problem: Falls - Risk of  Goal: *Absence of Falls  Description: Document Keyanna Darnell Fall Risk and appropriate interventions in the flowsheet.   Outcome: Progressing Towards Goal  Note: Fall Risk Interventions:            Medication Interventions: Evaluate medications/consider consulting pharmacy                   Problem: Patient Education: Go to Patient Education Activity  Goal: Patient/Family Education  Outcome: Progressing Towards Goal

## 2022-04-29 NOTE — PROGRESS NOTES
[facility] [facility] Adult  Hospitalist Group                                                                                          Hospitalist Progress Note  Milton Reis NP  Answering service: [phone] OR [phone] from in house phone        Date of Service:  2022  NAME:  Zhane Dunn  :  1955  MRN:  [id number]      Admission Summary:   Per H&P, This is a 68-year-old female with past medical history of anxiety, anemia. She comes over here because of abdominal pain that started this morning. The patient says that this morning when she woke up, she was perfectly at her baseline. She started having breakfast and soon after she started having epigastric abdominal pain. She states that the pain was about 10/10 in intensity, sharp, nonradiating in the epigastric region associated with nausea and vomiting. In fact, she vomited about 4-5 times. At no point of time did she vomit any blood. She did not have any diarrhea. No fever, cough, headache, dizziness, and no changes in bowel movements. Interval history / Subjective:     Patient seen on morning rounds, feeling better today, nausea is somewhat improved. Assessment & Plan:     Acute pancreatitis  Her leukocytosis is improving, was 20.9 yesterday, it is improved, 10.8 today  LFTs are stable his T bili  Lipase was elevated, greater than 3000, down to 1578 today  Gastroenterology following, appreciate recs  Underwent EGD  with endoscopic ultrasound with sludge and small stones in the gallbladder as well as biliary tree ranging in diameter between 4 and 8 mm. MRI/MRCP  with extrahepatic biliary dilatation with pancreatic duct dilatation.   Findings also consistent with acute pancreatitis  Gallbladder wall thickening also noted  Continuing IV hydration in the form of LR  Continuing analgesics  Continuing pantoprazole  General surgery following, plan for cholecystectomy over the weekend versus Monday    Seasonal allergies  Stable  Continuing cetirizine    Anxiety  She does have alprazolam listed on her home medication list, I did review the , she does not have any active prescriptions for this  Will have some alprazolam available inpatient if she needs it    Patient was initially admitted under observation. Acute pancreatitis with lipase greater than 3000, for surgical procedure. Patient will be here greater than 3 midnights. Based on this I am upgrading to inpatient status, this note shall act as my H&P, for details on past medical history, family history etc. please see the original H&P. Code status: FULL  DVT prophylaxis: LMWH    Care Plan discussed with: Patient/Family, Nurse and   Anticipated Disposition: Home w/Family  Anticipated Discharge: Greater than 48 hours     Hospital Problems  Date Reviewed: 3/1/2015          Codes Class Noted POA    Acute pancreatitis ICD-10-CM: K85.90  ICD-9-CM: 034.5  4/27/2022 Unknown                Review of Systems:   A comprehensive review of systems was negative except for that written in the HPI. Vital Signs:    Last 24hrs VS reviewed since prior progress note. Most recent are:  Visit Vitals  /71 (BP 1 Location: Right arm, BP Patient Position: At rest)   Pulse 73   Temp 98.1 °F (36.7 °C)   Resp 17   Ht 5' 2\" (1.575 m)   Wt 65.8 kg (145 lb)   SpO2 96%   BMI 26.52 kg/m²         Intake/Output Summary (Last 24 hours) at 4/29/2022 1146  Last data filed at 4/29/2022 0931  Gross per 24 hour   Intake 640 ml   Output    Net 640 ml        Physical Examination:     I had a face to face encounter with this patient and independently examined them on 4/29/2022 as outlined below:          Constitutional:  No acute distress, cooperative, pleasant    ENT:  Oral mucosa moist, oropharynx benign. Resp:  CTA bilaterally. No wheezing/rhonchi/rales.  No accessory muscle use   CV:  Regular rhythm, normal rate, no murmurs, gallops, rubs    GI:  Soft, non distended, non tender. normoactive bowel sounds, no hepatosplenomegaly     Musculoskeletal:  No edema, warm, 2+ pulses throughout    Neurologic:  Moves all extremities. AAOx3, CN II-XII reviewed            Data Review:    Review and/or order of clinical lab test  Review and/or order of tests in the radiology section of [facility]  I personally reviewed  Image      Labs:     Recent Labs     04/29/22 0321 04/28/22  0036   WBC 10.8 16.9*   HGB 11.7 12.8   HCT 35.6 40.1    191     Recent Labs     04/29/22 0321 04/28/22  0036 04/27/22  1241    142 140   K 3.4* 4.3 4.0   * 111* 109*   CO2 23 25 23   BUN 11 13 15   CREA 0.70 0.80 0.99    123* 140*   CA 8.2* 8.3* 9.3     Recent Labs     04/29/22 0321 04/28/22  0036 04/27/22  1241   ALT 45 70 94*   AP 57 78 98   TBILI 0.6 0.8 0.9   TP 6.1* 6.4 7.3   ALB 3.1* 3.6 4.1   GLOB 3.0 2.8 3.2   AML  --   --  >1,300*   LPSE 1,578* >3,000* >3,000*     No results for input(s): INR, PTP, APTT, INREXT, INREXT in the last 72 hours. No results for input(s): FE, TIBC, PSAT, FERR in the last 72 hours. No results found for: FOL, RBCF   No results for input(s): PH, PCO2, PO2 in the last 72 hours. No results for input(s): CPK, CKNDX, TROIQ in the last 72 hours.     No lab exists for component: CPKMB  Lab Results   Component Value Date/Time    Cholesterol, total 222 (H) 01/21/2014 09:46 AM    HDL Cholesterol 111 01/21/2014 09:46 AM    LDL, calculated 98 01/21/2014 09:46 AM    Triglyceride 63 01/21/2014 09:46 AM     No results found for: GLUCPOC  Lab Results   Component Value Date/Time    Color DARK YELLOW 04/27/2022 12:41 PM    Appearance CLEAR 04/27/2022 12:41 PM    Specific gravity 1.023 04/27/2022 12:41 PM    pH (UA) 6.0 04/27/2022 12:41 PM    Protein TRACE (A) 04/27/2022 12:41 PM    Glucose Negative 04/27/2022 12:41 PM    Ketone TRACE (A) 04/27/2022 12:41 PM    Bilirubin Negative 04/27/2022 12:41 PM    Urobilinogen 1.0 04/27/2022 12:41 PM    Nitrites Negative 04/27/2022 12:41 PM Leukocyte Esterase TRACE (A) 04/27/2022 12:41 PM    Epithelial cells FEW 04/27/2022 12:41 PM    Bacteria Negative 04/27/2022 12:41 PM    WBC 0-4 04/27/2022 12:41 PM    RBC 0-5 04/27/2022 12:41 PM         Medications Reviewed:     Current Facility-Administered Medications   Medication Dose Route Frequency    benzocaine-menthoL (CEPACOL) lozenge 1 Lozenge  1 Lozenge Mucous Membrane PRN    lactated Ringers infusion  125 mL/hr IntraVENous CONTINUOUS    prochlorperazine (COMPAZINE) with saline injection 5 mg  5 mg IntraVENous Q6H PRN    sodium chloride (NS) flush 5-40 mL  5-40 mL IntraVENous Q8H    sodium chloride (NS) flush 5-40 mL  5-40 mL IntraVENous PRN    cetirizine (ZYRTEC) tablet 10 mg  10 mg Oral DAILY    ALPRAZolam (XANAX) tablet 0.5 mg  0.5 mg Oral Q8H PRN    sodium chloride (NS) flush 5-40 mL  5-40 mL IntraVENous Q8H    sodium chloride (NS) flush 5-40 mL  5-40 mL IntraVENous PRN    acetaminophen (TYLENOL) tablet 650 mg  650 mg Oral Q6H PRN    Or    acetaminophen (TYLENOL) suppository 650 mg  650 mg Rectal Q6H PRN    polyethylene glycol (MIRALAX) packet 17 g  17 g Oral DAILY PRN    ondansetron (ZOFRAN) injection 4 mg  4 mg IntraVENous Q6H PRN    enoxaparin (LOVENOX) injection 40 mg  40 mg SubCUTAneous DAILY    HYDROmorphone (DILAUDID) injection 0.5 mg  0.5 mg IntraVENous Q4H PRN    pantoprazole (PROTONIX) injection 40 mg  40 mg IntraVENous Q12H    And    sodium chloride (NS) flush 10 mL  10 mL IntraVENous Q12H     ______________________________________________________________________  EXPECTED LENGTH OF STAY: - - -  ACTUAL LENGTH OF STAY:          0                 Juan C Davis NP

## 2022-04-29 NOTE — PROGRESS NOTES
General Surgery Daily Progress Note    Admit Date: 2022  Post-Operative Day: 1 Day Post-Op from Procedure(s):  ESOPHAGOGASTRODUODENOSCOPY (EGD)  ENDOSCOPIC ULTRASOUND (EUS)     Subjective:     Last 24 hrs: pt is feeling better; no n/v, less epigastric pain; lipase down       Objective:     Blood pressure 108/71, pulse 73, temperature 98.1 °F (36.7 °C), resp. rate 17, height 5' 2\" (1.575 m), weight 145 lb (65.8 kg), SpO2 96 %. Temp (24hrs), Av.5 °F (36.9 °C), Min:97.5 °F (36.4 °C), Max:99.4 °F (37.4 °C)      _____________________  Physical Exam:     Alert and Oriented, x3, in no acute distress.   Cardiovascular: RRR, no peripheral edema  Abdomen: mild epigastric tenderness, soft      Assessment:   Active Problems:    Acute pancreatitis (2022)            Plan:     Would cont clears  Possible to get lap pool done over weekend if she cont to improve from pancreatitis standpoint  There is an US pending; no stones seen on MRCP or CT, just sludge  Symptom management    Data Review:    Recent Labs     226 22  1241   WBC 10.8 16.9* 20.9*   HGB 11.7 12.8 13.9   HCT 35.6 40.1 42.9    191 208     Recent Labs     22  0036 22  1241    142 140   K 3.4* 4.3 4.0   * 111* 109*   CO2 23 25 23    123* 140*   BUN 11 13 15   CREA 0.70 0.80 0.99   CA 8.2* 8.3* 9.3   ALB 3.1* 3.6 4.1   ALT 45 70 94*     Recent Labs     22  0036 22  1241   AML  --   --  >1,300*   LPSE 1,578* >3,000* >3,000*           ______________________  Medications:    Current Facility-Administered Medications   Medication Dose Route Frequency    benzocaine-menthoL (CEPACOL) lozenge 1 Lozenge  1 Lozenge Mucous Membrane PRN    lactated Ringers infusion  125 mL/hr IntraVENous CONTINUOUS    prochlorperazine (COMPAZINE) with saline injection 5 mg  5 mg IntraVENous Q6H PRN    sodium chloride (NS) flush 5-40 mL  5-40 mL IntraVENous Q8H    sodium chloride (NS) flush 5-40 mL  5-40 mL IntraVENous PRN    cetirizine (ZYRTEC) tablet 10 mg  10 mg Oral DAILY    ALPRAZolam (XANAX) tablet 0.5 mg  0.5 mg Oral Q8H PRN    sodium chloride (NS) flush 5-40 mL  5-40 mL IntraVENous Q8H    sodium chloride (NS) flush 5-40 mL  5-40 mL IntraVENous PRN    acetaminophen (TYLENOL) tablet 650 mg  650 mg Oral Q6H PRN    Or    acetaminophen (TYLENOL) suppository 650 mg  650 mg Rectal Q6H PRN    polyethylene glycol (MIRALAX) packet 17 g  17 g Oral DAILY PRN    ondansetron (ZOFRAN) injection 4 mg  4 mg IntraVENous Q6H PRN    enoxaparin (LOVENOX) injection 40 mg  40 mg SubCUTAneous DAILY    HYDROmorphone (DILAUDID) injection 0.5 mg  0.5 mg IntraVENous Q4H PRN    pantoprazole (PROTONIX) injection 40 mg  40 mg IntraVENous Q12H    And    sodium chloride (NS) flush 10 mL  10 mL IntraVENous Q12H       Aurora Viera NP  4/29/2022    Patient independently interviewed and examined; agree with NP assessment and plan. She feels better today, with less pain and nausea. She has started clear liquids and thus far is tolerating this diet. Lipase has decreased, excess white blood cell count. We will continue clear liquids today, recheck labs in morning. We discussed cholecystectomy on this admission to avoid risk of recurrent biliary pancreatitis. She is amenable to this plan. She is on the operating room schedule for Monday. She understands that if she continues to improve over the weekend, surgery could be performed before Monday if all in agreement.

## 2022-04-30 LAB
ALBUMIN SERPL-MCNC: 3.2 G/DL (ref 3.5–5)
ALBUMIN/GLOB SERPL: 1.3 {RATIO} (ref 1.1–2.2)
ALP SERPL-CCNC: 58 U/L (ref 45–117)
ALT SERPL-CCNC: 40 U/L (ref 12–78)
ANION GAP SERPL CALC-SCNC: 7 MMOL/L (ref 5–15)
AST SERPL-CCNC: 21 U/L (ref 15–37)
BASOPHILS # BLD: 0.1 K/UL (ref 0–0.1)
BASOPHILS NFR BLD: 1 % (ref 0–1)
BILIRUB SERPL-MCNC: 0.7 MG/DL (ref 0.2–1)
BUN SERPL-MCNC: 6 MG/DL (ref 6–20)
BUN/CREAT SERPL: 8 (ref 12–20)
CALCIUM SERPL-MCNC: 8.4 MG/DL (ref 8.5–10.1)
CHLORIDE SERPL-SCNC: 112 MMOL/L (ref 97–108)
CO2 SERPL-SCNC: 26 MMOL/L (ref 21–32)
CREAT SERPL-MCNC: 0.74 MG/DL (ref 0.55–1.02)
DIFFERENTIAL METHOD BLD: ABNORMAL
EOSINOPHIL # BLD: 0.2 K/UL (ref 0–0.4)
EOSINOPHIL NFR BLD: 3 % (ref 0–7)
ERYTHROCYTE [DISTWIDTH] IN BLOOD BY AUTOMATED COUNT: 13 % (ref 11.5–14.5)
GLOBULIN SER CALC-MCNC: 2.4 G/DL (ref 2–4)
GLUCOSE SERPL-MCNC: 80 MG/DL (ref 65–100)
HCT VFR BLD AUTO: 36.8 % (ref 35–47)
HGB BLD-MCNC: 12 G/DL (ref 11.5–16)
IMM GRANULOCYTES # BLD AUTO: 0.1 K/UL (ref 0–0.04)
IMM GRANULOCYTES NFR BLD AUTO: 1 % (ref 0–0.5)
LIPASE SERPL-CCNC: 1162 U/L (ref 73–393)
LYMPHOCYTES # BLD: 1.3 K/UL (ref 0.8–3.5)
LYMPHOCYTES NFR BLD: 18 % (ref 12–49)
MCH RBC QN AUTO: 30.8 PG (ref 26–34)
MCHC RBC AUTO-ENTMCNC: 32.6 G/DL (ref 30–36.5)
MCV RBC AUTO: 94.6 FL (ref 80–99)
MONOCYTES # BLD: 0.7 K/UL (ref 0–1)
MONOCYTES NFR BLD: 9 % (ref 5–13)
NEUTS SEG # BLD: 5 K/UL (ref 1.8–8)
NEUTS SEG NFR BLD: 68 % (ref 32–75)
NRBC # BLD: 0 K/UL (ref 0–0.01)
NRBC BLD-RTO: 0 PER 100 WBC
PLATELET # BLD AUTO: 174 K/UL (ref 150–400)
PMV BLD AUTO: 11.4 FL (ref 8.9–12.9)
POTASSIUM SERPL-SCNC: 3.7 MMOL/L (ref 3.5–5.1)
PROT SERPL-MCNC: 5.6 G/DL (ref 6.4–8.2)
RBC # BLD AUTO: 3.89 M/UL (ref 3.8–5.2)
SODIUM SERPL-SCNC: 145 MMOL/L (ref 136–145)
WBC # BLD AUTO: 7.3 K/UL (ref 3.6–11)

## 2022-04-30 PROCEDURE — 80053 COMPREHEN METABOLIC PANEL: CPT

## 2022-04-30 PROCEDURE — 65270000029 HC RM PRIVATE

## 2022-04-30 PROCEDURE — 74011000250 HC RX REV CODE- 250: Performed by: INTERNAL MEDICINE

## 2022-04-30 PROCEDURE — 85025 COMPLETE CBC W/AUTO DIFF WBC: CPT

## 2022-04-30 PROCEDURE — 83690 ASSAY OF LIPASE: CPT

## 2022-04-30 PROCEDURE — 99231 SBSQ HOSP IP/OBS SF/LOW 25: CPT | Performed by: SURGERY

## 2022-04-30 PROCEDURE — 94760 N-INVAS EAR/PLS OXIMETRY 1: CPT

## 2022-04-30 PROCEDURE — 74011250636 HC RX REV CODE- 250/636: Performed by: NURSE PRACTITIONER

## 2022-04-30 PROCEDURE — 74011250637 HC RX REV CODE- 250/637: Performed by: HOSPITALIST

## 2022-04-30 PROCEDURE — 36415 COLL VENOUS BLD VENIPUNCTURE: CPT

## 2022-04-30 PROCEDURE — C9113 INJ PANTOPRAZOLE SODIUM, VIA: HCPCS | Performed by: HOSPITALIST

## 2022-04-30 PROCEDURE — 74011250636 HC RX REV CODE- 250/636: Performed by: HOSPITALIST

## 2022-04-30 PROCEDURE — 74011000250 HC RX REV CODE- 250: Performed by: HOSPITALIST

## 2022-04-30 RX ADMIN — SODIUM CHLORIDE, POTASSIUM CHLORIDE, SODIUM LACTATE AND CALCIUM CHLORIDE 125 ML/HR: 600; 310; 30; 20 INJECTION, SOLUTION INTRAVENOUS at 12:45

## 2022-04-30 RX ADMIN — PANTOPRAZOLE SODIUM 40 MG: 40 INJECTION, POWDER, FOR SOLUTION INTRAVENOUS at 21:24

## 2022-04-30 RX ADMIN — PANTOPRAZOLE SODIUM 40 MG: 40 INJECTION, POWDER, FOR SOLUTION INTRAVENOUS at 08:44

## 2022-04-30 RX ADMIN — SODIUM CHLORIDE, POTASSIUM CHLORIDE, SODIUM LACTATE AND CALCIUM CHLORIDE 125 ML/HR: 600; 310; 30; 20 INJECTION, SOLUTION INTRAVENOUS at 21:24

## 2022-04-30 RX ADMIN — SODIUM CHLORIDE, PRESERVATIVE FREE 10 ML: 5 INJECTION INTRAVENOUS at 21:25

## 2022-04-30 RX ADMIN — ENOXAPARIN SODIUM 40 MG: 100 INJECTION SUBCUTANEOUS at 08:44

## 2022-04-30 RX ADMIN — SODIUM CHLORIDE, PRESERVATIVE FREE 10 ML: 5 INJECTION INTRAVENOUS at 15:44

## 2022-04-30 RX ADMIN — SODIUM CHLORIDE, PRESERVATIVE FREE 10 ML: 5 INJECTION INTRAVENOUS at 08:44

## 2022-04-30 RX ADMIN — SODIUM CHLORIDE, PRESERVATIVE FREE 10 ML: 5 INJECTION INTRAVENOUS at 07:00

## 2022-04-30 RX ADMIN — SODIUM CHLORIDE, PRESERVATIVE FREE 10 ML: 5 INJECTION INTRAVENOUS at 15:43

## 2022-04-30 RX ADMIN — CETIRIZINE HYDROCHLORIDE 10 MG: 10 TABLET, FILM COATED ORAL at 08:44

## 2022-04-30 RX ADMIN — SODIUM CHLORIDE, POTASSIUM CHLORIDE, SODIUM LACTATE AND CALCIUM CHLORIDE 125 ML/HR: 600; 310; 30; 20 INJECTION, SOLUTION INTRAVENOUS at 04:52

## 2022-04-30 NOTE — PROGRESS NOTES
Progress Note    Patient: Mimi Gutierrez MRN: [id number]  SSN: [id number]    YOB: 1955  Age: 74 y.o. Sex: female      Admit Date: 2022    2 Days Post-Op    Procedure:  Procedure(s):  ESOPHAGOGASTRODUODENOSCOPY (EGD)  ENDOSCOPIC ULTRASOUND (EUS)    Subjective:     No acute surgical issues. Pt is doing better. Pain is better. No nausea or vomiting. Lipase is trending down but still elevated    Objective:     Visit Vitals  /70 (BP 1 Location: Left upper arm)   Pulse 70   Temp 98.6 °F (37 °C)   Resp 18   Ht 5' 2\" (1.575 m)   Wt 145 lb (65.8 kg)   SpO2 98%   BMI 26.52 kg/m²       Temp (24hrs), Av.6 °F (37 °C), Min:98.5 °F (36.9 °C), Max:98.6 °F (37 °C)        Physical Exam:    Gen:  NAD  Pulm:  Unlabored  Abd:  S/ND/mild TTP in RUQ    Recent Results (from the past 24 hour(s))   CBC WITH AUTOMATED DIFF    Collection Time: 22  4:46 AM   Result Value Ref Range    WBC 7.3 3.6 - 11.0 K/uL    RBC 3.89 3.80 - 5.20 M/uL    HGB 12.0 11.5 - 16.0 g/dL    HCT 36.8 35.0 - 47.0 %    MCV 94.6 80.0 - 99.0 FL    MCH 30.8 26.0 - 34.0 PG    MCHC 32.6 30.0 - 36.5 g/dL    RDW 13.0 11.5 - 14.5 %    PLATELET 033 188 - 943 K/uL    MPV 11.4 8.9 - 12.9 FL    NRBC 0.0 0  WBC    ABSOLUTE NRBC 0.00 0.00 - 0.01 K/uL    NEUTROPHILS 68 32 - 75 %    LYMPHOCYTES 18 12 - 49 %    MONOCYTES 9 5 - 13 %    EOSINOPHILS 3 0 - 7 %    BASOPHILS 1 0 - 1 %    IMMATURE GRANULOCYTES 1 (H) 0.0 - 0.5 %    ABS. NEUTROPHILS 5.0 1.8 - 8.0 K/UL    ABS. LYMPHOCYTES 1.3 0.8 - 3.5 K/UL    ABS. MONOCYTES 0.7 0.0 - 1.0 K/UL    ABS. EOSINOPHILS 0.2 0.0 - 0.4 K/UL    ABS. BASOPHILS 0.1 0.0 - 0.1 K/UL    ABS. IMM.  GRANS. 0.1 (H) 0.00 - 0.04 K/UL    DF AUTOMATED     METABOLIC PANEL, COMPREHENSIVE    Collection Time: 22  4:46 AM   Result Value Ref Range    Sodium 145 136 - 145 mmol/L    Potassium 3.7 3.5 - 5.1 mmol/L    Chloride 112 (H) 97 - 108 mmol/L    CO2 26 21 - 32 mmol/L    Anion gap 7 5 - 15 mmol/L    Glucose 80 65 - 100 mg/dL    BUN 6 6 - 20 MG/DL    Creatinine 0.74 0.55 - 1.02 MG/DL    BUN/Creatinine ratio 8 (L) 12 - 20      GFR est AA >60 >60 ml/min/1.73m2    GFR est non-AA >60 >60 ml/min/1.73m2    Calcium 8.4 (L) 8.5 - 10.1 MG/DL    Bilirubin, total 0.7 0.2 - 1.0 MG/DL    ALT (SGPT) 40 12 - 78 U/L    AST (SGOT) 21 15 - 37 U/L    Alk. phosphatase 58 45 - 117 U/L    Protein, total 5.6 (L) 6.4 - 8.2 g/dL    Albumin 3.2 (L) 3.5 - 5.0 g/dL    Globulin 2.4 2.0 - 4.0 g/dL    A-G Ratio 1.3 1.1 - 2.2     LIPASE    Collection Time: 04/30/22  4:46 AM   Result Value Ref Range    Lipase 1,162 (H) 73 - 393 U/L         Assessment:     Hospital Problems  Date Reviewed: 3/1/2015          Codes Class Noted POA    Pancreatitis ICD-10-CM: K85.90  ICD-9-CM: 523.7  4/29/2022 Unknown        Acute pancreatitis ICD-10-CM: K85.90  ICD-9-CM: 762.7  4/27/2022 Unknown              Plan/Recommendations/Medical Decision Making:     - Gallstone pancreatitis:  No emergent indication for operation  - Continue to monitor lipase level  - Full liquid diet  - Pain control  - Out of bed.   Encourage ambulation

## 2022-04-30 NOTE — PROGRESS NOTES
Problem: Falls - Risk of  Goal: *Absence of Falls  Description: Document Rk Crespo Fall Risk and appropriate interventions in the flowsheet.   Outcome: Progressing Towards Goal  Note: Fall Risk Interventions:            Medication Interventions: Evaluate medications/consider consulting pharmacy                   Problem: Patient Education: Go to Patient Education Activity  Goal: Patient/Family Education  Outcome: Progressing Towards Goal

## 2022-04-30 NOTE — PROGRESS NOTES
Problem: Falls - Risk of  Goal: *Absence of Falls  Description: Document Becka Hole Fall Risk and appropriate interventions in the flowsheet.   Outcome: Progressing Towards Goal  Note: Fall Risk Interventions:            Medication Interventions: Evaluate medications/consider consulting pharmacy,Teach patient to arise slowly                   Problem: Patient Education: Go to Patient Education Activity  Goal: Patient/Family Education  Outcome: Progressing Towards Goal

## 2022-04-30 NOTE — PROGRESS NOTES
[address] [facility] Adult  Hospitalist Group                                                                                          Hospitalist Progress Note  Latonya Greco NP  Answering service: [phone] from in house phone        Date of Service:  2022  NAME:  Anthony Underwood  :  1955  MRN:  [id number]      Admission Summary:   Per H&P, This is a 58-year-old female with past medical history of anxiety, anemia. She comes over here because of abdominal pain that started this morning. The patient says that this morning when she woke up, she was perfectly at her baseline. She started having breakfast and soon after she started having epigastric abdominal pain. She states that the pain was about 10/10 in intensity, sharp, nonradiating in the epigastric region associated with nausea and vomiting. In fact, she vomited about 4-5 times. At no point of time did she vomit any blood. She did not have any diarrhea. No fever, cough, headache, dizziness, and no changes in bowel movements. Interval history / Subjective:       I saw the patient this morning on rounds. Feeling much better today. Still with some mild abdominal tenderness with palpation, tolerating clear liquid diet     Assessment & Plan:     Acute pancreatitis  Her leukocytosis is improving, was 20.9 yesterday, it is improved, 10.8 today  T bili and transaminases are within normal limits  Lipase was elevated, greater than 3000 at presentation, 1162 today  Gastroenterology following, appreciate recs  Underwent EGD  with endoscopic ultrasound with sludge and small stones in the gallbladder as well as biliary tree ranging in diameter between 4 and 8 mm. MRI/MRCP  with extrahepatic biliary dilatation with pancreatic duct dilatation.   Findings also consistent with acute pancreatitis  Gallbladder wall thickening also noted  Continuing LR  Continuing pantoprazole  Continuing analgesia as needed  General surgery following, appreciate your assistance. Plan for cholecystectomy tomorrow versus Monday        Seasonal allergies  Stable  Continue cetirizine    Anxiety  She does have alprazolam listed on her home medication list, I did review the , she does not have any active prescriptions for this  Will have some alprazolam available inpatient if she needs it        Code status: FULL    DVT prophylaxis: LMWH    Care Plan discussed with: Patient/Family and Nurse     Anticipated Disposition: Home w/Family     Anticipated Discharge: Pending on surgery. Likely discharge Monday versus Tuesday     Hospital Problems  Date Reviewed: 3/1/2015          Codes Class Noted POA    Pancreatitis ICD-10-CM: K85.90  ICD-9-CM: 198.1  4/29/2022 Unknown        Acute pancreatitis ICD-10-CM: K85.90  ICD-9-CM: 760.8  4/27/2022 Unknown                Review of Systems:   A comprehensive review of systems was negative except for that written in the HPI. Vital Signs:    Last 24hrs VS reviewed since prior progress note. Most recent are:  Visit Vitals  /70 (BP 1 Location: Left upper arm)   Pulse 70   Temp 98.6 °F (37 °C)   Resp 18   Ht 5' 2\" (1.575 m)   Wt 65.8 kg (145 lb)   SpO2 98%   BMI 26.52 kg/m²         Intake/Output Summary (Last 24 hours) at 4/30/2022 1033  Last data filed at 4/30/2022 9162  Gross per 24 hour   Intake 240 ml   Output    Net 240 ml        Physical Examination:     I had a face to face encounter with this patient and independently examined them on 4/30/2022 as outlined below:          Constitutional:  No acute distress, cooperative, pleasant    ENT:  Oral mucosa moist, oropharynx benign. Resp:  CTA bilaterally. No wheezing/rhonchi/rales. No accessory muscle use   CV:  Regular rhythm, normal rate, no murmurs, gallops, rubs    GI:  Soft, non distended, non tender. normoactive bowel sounds, no hepatosplenomegaly     Musculoskeletal:  No edema, warm, 2+ pulses throughout    Neurologic:  Moves all extremities.   AAOx3, CN II-XII reviewed            Data Review:    Review and/or order of clinical lab test  Review and/or order of tests in the radiology section of CPT  I personally reviewed  Image      Labs:     Recent Labs     04/30/22 0446 04/29/22  0321   WBC 7.3 10.8   HGB 12.0 11.7   HCT 36.8 35.6    159     Recent Labs     04/30/22 0446 04/29/22 0321 04/28/22  0036    142 142   K 3.7 3.4* 4.3   * 111* 111*   CO2 26 23 25   BUN 6 11 13   CREA 0.74 0.70 0.80   GLU 80 100 123*   CA 8.4* 8.2* 8.3*     Recent Labs     04/30/22 0446 04/29/22 0321 04/28/22  0036 04/27/22  1241 04/27/22  1241   ALT 40 45 70   < > 94*   AP 58 57 78   < > 98   TBILI 0.7 0.6 0.8   < > 0.9   TP 5.6* 6.1* 6.4   < > 7.3   ALB 3.2* 3.1* 3.6   < > 4.1   GLOB 2.4 3.0 2.8   < > 3.2   AML  --   --   --   --  >1,300*   LPSE 1,162* 1,578* >3,000*   < > >3,000*    < > = values in this interval not displayed. No results for input(s): INR, PTP, APTT, INREXT, INREXT in the last 72 hours. No results for input(s): FE, TIBC, PSAT, FERR in the last 72 hours. No results found for: FOL, RBCF   No results for input(s): PH, PCO2, PO2 in the last 72 hours. No results for input(s): CPK, CKNDX, TROIQ in the last 72 hours.     No lab exists for component: CPKMB  Lab Results   Component Value Date/Time    Cholesterol, total 222 (H) 01/21/2014 09:46 AM    HDL Cholesterol 111 01/21/2014 09:46 AM    LDL, calculated 98 01/21/2014 09:46 AM    Triglyceride 63 01/21/2014 09:46 AM     No results found for: GLUCPOC  Lab Results   Component Value Date/Time    Color DARK YELLOW 04/27/2022 12:41 PM    Appearance CLEAR 04/27/2022 12:41 PM    Specific gravity 1.023 04/27/2022 12:41 PM    pH (UA) 6.0 04/27/2022 12:41 PM    Protein TRACE (A) 04/27/2022 12:41 PM    Glucose Negative 04/27/2022 12:41 PM    Ketone TRACE (A) 04/27/2022 12:41 PM    Bilirubin Negative 04/27/2022 12:41 PM    Urobilinogen 1.0 04/27/2022 12:41 PM    Nitrites Negative 04/27/2022 12:41 PM Leukocyte Esterase TRACE (A) 04/27/2022 12:41 PM    Epithelial cells FEW 04/27/2022 12:41 PM    Bacteria Negative 04/27/2022 12:41 PM    WBC 0-4 04/27/2022 12:41 PM    RBC 0-5 04/27/2022 12:41 PM         Medications Reviewed:     Current Facility-Administered Medications   Medication Dose Route Frequency    benzocaine-menthoL (CEPACOL) lozenge 1 Lozenge  1 Lozenge Mucous Membrane PRN    lactated Ringers infusion  125 mL/hr IntraVENous CONTINUOUS    prochlorperazine (COMPAZINE) with saline injection 5 mg  5 mg IntraVENous Q6H PRN    sodium chloride (NS) flush 5-40 mL  5-40 mL IntraVENous Q8H    sodium chloride (NS) flush 5-40 mL  5-40 mL IntraVENous PRN    cetirizine (ZYRTEC) tablet 10 mg  10 mg Oral DAILY    ALPRAZolam (XANAX) tablet 0.5 mg  0.5 mg Oral Q8H PRN    sodium chloride (NS) flush 5-40 mL  5-40 mL IntraVENous Q8H    sodium chloride (NS) flush 5-40 mL  5-40 mL IntraVENous PRN    acetaminophen (TYLENOL) tablet 650 mg  650 mg Oral Q6H PRN    Or    acetaminophen (TYLENOL) suppository 650 mg  650 mg Rectal Q6H PRN    polyethylene glycol (MIRALAX) packet 17 g  17 g Oral DAILY PRN    ondansetron (ZOFRAN) injection 4 mg  4 mg IntraVENous Q6H PRN    enoxaparin (LOVENOX) injection 40 mg  40 mg SubCUTAneous DAILY    HYDROmorphone (DILAUDID) injection 0.5 mg  0.5 mg IntraVENous Q4H PRN    pantoprazole (PROTONIX) injection 40 mg  40 mg IntraVENous Q12H    And    sodium chloride (NS) flush 10 mL  10 mL IntraVENous Q12H     ______________________________________________________________________  EXPECTED LENGTH OF STAY: 2d 9h  ACTUAL LENGTH OF STAY:          1                 Josiah Medina NP

## 2022-05-01 LAB
ALBUMIN SERPL-MCNC: 3.2 G/DL (ref 3.5–5)
ALBUMIN/GLOB SERPL: 1.2 {RATIO} (ref 1.1–2.2)
ALP SERPL-CCNC: 60 U/L (ref 45–117)
ALT SERPL-CCNC: 37 U/L (ref 12–78)
ANION GAP SERPL CALC-SCNC: 9 MMOL/L (ref 5–15)
AST SERPL-CCNC: 22 U/L (ref 15–37)
BASOPHILS # BLD: 0.1 K/UL (ref 0–0.1)
BASOPHILS NFR BLD: 1 % (ref 0–1)
BILIRUB SERPL-MCNC: 0.6 MG/DL (ref 0.2–1)
BUN SERPL-MCNC: 5 MG/DL (ref 6–20)
BUN/CREAT SERPL: 7 (ref 12–20)
CALCIUM SERPL-MCNC: 8.5 MG/DL (ref 8.5–10.1)
CHLORIDE SERPL-SCNC: 110 MMOL/L (ref 97–108)
CO2 SERPL-SCNC: 25 MMOL/L (ref 21–32)
CREAT SERPL-MCNC: 0.74 MG/DL (ref 0.55–1.02)
DIFFERENTIAL METHOD BLD: ABNORMAL
EOSINOPHIL # BLD: 0.3 K/UL (ref 0–0.4)
EOSINOPHIL NFR BLD: 4 % (ref 0–7)
ERYTHROCYTE [DISTWIDTH] IN BLOOD BY AUTOMATED COUNT: 12.8 % (ref 11.5–14.5)
GLOBULIN SER CALC-MCNC: 2.7 G/DL (ref 2–4)
GLUCOSE SERPL-MCNC: 92 MG/DL (ref 65–100)
HCT VFR BLD AUTO: 35.7 % (ref 35–47)
HGB BLD-MCNC: 12.2 G/DL (ref 11.5–16)
IMM GRANULOCYTES # BLD AUTO: 0.1 K/UL (ref 0–0.04)
IMM GRANULOCYTES NFR BLD AUTO: 1 % (ref 0–0.5)
LIPASE SERPL-CCNC: 762 U/L (ref 73–393)
LYMPHOCYTES # BLD: 1.3 K/UL (ref 0.8–3.5)
LYMPHOCYTES NFR BLD: 18 % (ref 12–49)
MCH RBC QN AUTO: 31.6 PG (ref 26–34)
MCHC RBC AUTO-ENTMCNC: 34.2 G/DL (ref 30–36.5)
MCV RBC AUTO: 92.5 FL (ref 80–99)
MONOCYTES # BLD: 0.6 K/UL (ref 0–1)
MONOCYTES NFR BLD: 9 % (ref 5–13)
NEUTS SEG # BLD: 5 K/UL (ref 1.8–8)
NEUTS SEG NFR BLD: 67 % (ref 32–75)
NRBC # BLD: 0 K/UL (ref 0–0.01)
NRBC BLD-RTO: 0 PER 100 WBC
PLATELET # BLD AUTO: 193 K/UL (ref 150–400)
PMV BLD AUTO: 11.4 FL (ref 8.9–12.9)
POTASSIUM SERPL-SCNC: 3.6 MMOL/L (ref 3.5–5.1)
PROT SERPL-MCNC: 5.9 G/DL (ref 6.4–8.2)
RBC # BLD AUTO: 3.86 M/UL (ref 3.8–5.2)
SODIUM SERPL-SCNC: 144 MMOL/L (ref 136–145)
WBC # BLD AUTO: 7.4 K/UL (ref 3.6–11)

## 2022-05-01 PROCEDURE — 65270000029 HC RM PRIVATE

## 2022-05-01 PROCEDURE — 74011250636 HC RX REV CODE- 250/636: Performed by: NURSE PRACTITIONER

## 2022-05-01 PROCEDURE — 74011000250 HC RX REV CODE- 250: Performed by: INTERNAL MEDICINE

## 2022-05-01 PROCEDURE — 85025 COMPLETE CBC W/AUTO DIFF WBC: CPT

## 2022-05-01 PROCEDURE — 74011250636 HC RX REV CODE- 250/636: Performed by: HOSPITALIST

## 2022-05-01 PROCEDURE — 74011250637 HC RX REV CODE- 250/637: Performed by: HOSPITALIST

## 2022-05-01 PROCEDURE — 99231 SBSQ HOSP IP/OBS SF/LOW 25: CPT | Performed by: SURGERY

## 2022-05-01 PROCEDURE — 74011250637 HC RX REV CODE- 250/637: Performed by: NURSE PRACTITIONER

## 2022-05-01 PROCEDURE — 83690 ASSAY OF LIPASE: CPT

## 2022-05-01 PROCEDURE — 80053 COMPREHEN METABOLIC PANEL: CPT

## 2022-05-01 PROCEDURE — 74011000250 HC RX REV CODE- 250: Performed by: HOSPITALIST

## 2022-05-01 PROCEDURE — 36415 COLL VENOUS BLD VENIPUNCTURE: CPT

## 2022-05-01 PROCEDURE — C9113 INJ PANTOPRAZOLE SODIUM, VIA: HCPCS | Performed by: HOSPITALIST

## 2022-05-01 RX ORDER — OXYCODONE HYDROCHLORIDE 5 MG/1
5 TABLET ORAL
Status: DISCONTINUED | OUTPATIENT
Start: 2022-05-01 | End: 2022-05-03 | Stop reason: HOSPADM

## 2022-05-01 RX ADMIN — SODIUM CHLORIDE, PRESERVATIVE FREE 10 ML: 5 INJECTION INTRAVENOUS at 22:24

## 2022-05-01 RX ADMIN — PANTOPRAZOLE SODIUM 40 MG: 40 INJECTION, POWDER, FOR SOLUTION INTRAVENOUS at 08:35

## 2022-05-01 RX ADMIN — CETIRIZINE HYDROCHLORIDE 10 MG: 10 TABLET, FILM COATED ORAL at 08:35

## 2022-05-01 RX ADMIN — SODIUM CHLORIDE, PRESERVATIVE FREE 10 ML: 5 INJECTION INTRAVENOUS at 22:23

## 2022-05-01 RX ADMIN — OXYCODONE 5 MG: 5 TABLET ORAL at 08:42

## 2022-05-01 RX ADMIN — SODIUM CHLORIDE, POTASSIUM CHLORIDE, SODIUM LACTATE AND CALCIUM CHLORIDE 100 ML/HR: 600; 310; 30; 20 INJECTION, SOLUTION INTRAVENOUS at 22:29

## 2022-05-01 RX ADMIN — SODIUM CHLORIDE, PRESERVATIVE FREE 10 ML: 5 INJECTION INTRAVENOUS at 08:36

## 2022-05-01 RX ADMIN — ONDANSETRON 4 MG: 2 INJECTION INTRAMUSCULAR; INTRAVENOUS at 10:42

## 2022-05-01 RX ADMIN — SALINE NASAL SPRAY 2 SPRAY: 1.5 SOLUTION NASAL at 10:42

## 2022-05-01 RX ADMIN — PANTOPRAZOLE SODIUM 40 MG: 40 INJECTION, POWDER, FOR SOLUTION INTRAVENOUS at 22:20

## 2022-05-01 RX ADMIN — SODIUM CHLORIDE, POTASSIUM CHLORIDE, SODIUM LACTATE AND CALCIUM CHLORIDE 100 ML/HR: 600; 310; 30; 20 INJECTION, SOLUTION INTRAVENOUS at 15:18

## 2022-05-01 RX ADMIN — SODIUM CHLORIDE, POTASSIUM CHLORIDE, SODIUM LACTATE AND CALCIUM CHLORIDE 125 ML/HR: 600; 310; 30; 20 INJECTION, SOLUTION INTRAVENOUS at 05:24

## 2022-05-01 RX ADMIN — SALINE NASAL SPRAY 2 SPRAY: 1.5 SOLUTION NASAL at 13:20

## 2022-05-01 RX ADMIN — ACETAMINOPHEN 650 MG: 325 TABLET ORAL at 13:16

## 2022-05-01 RX ADMIN — ENOXAPARIN SODIUM 40 MG: 100 INJECTION SUBCUTANEOUS at 08:36

## 2022-05-01 RX ADMIN — SODIUM CHLORIDE, PRESERVATIVE FREE 10 ML: 5 INJECTION INTRAVENOUS at 15:18

## 2022-05-01 NOTE — PROGRESS NOTES
[address] [facility]  Hospitalist Group                                                                                          Hospitalist Progress Note  Mike Crews NP  Answering service: [phone] from in house phone        Date of Service:  2022  NAME:  Onel Flowers  :  1955  MRN:  [id number]      Admission Summary:   Per H&P, This is a 25-year-old female with past medical history of anxiety, anemia. She comes over here because of abdominal pain that started this morning. The patient says that this morning when she woke up, she was perfectly at her baseline. She started having breakfast and soon after she started having epigastric abdominal pain. She states that the pain was about 10/10 in intensity, sharp, nonradiating in the epigastric region associated with nausea and vomiting. In fact, she vomited about 4-5 times. At no point of time did she vomit any blood. She did not have any diarrhea. No fever, cough, headache, dizziness, and no changes in bowel movements. Interval history / Subjective:       I saw the patient this morning on rounds. Feeling better today, I had ambulated to the bathroom at the moment of the encounter. Pain is improved, tolerating full liquid diet       Assessment & Plan:     Acute pancreatitis  Her leukocytosis is improving, was 20.9 yesterday, it is improved, 10.8 today  T bili and transaminases are within normal limits  Lipase was elevated, greater than 3000 at presentation, improved, 762 today  Gastroenterology following, appreciate recs  Underwent EGD  with endoscopic ultrasound with sludge and small stones in the gallbladder as well as biliary tree ranging in diameter between 4 and 8 mm. MRI/MRCP  with extrahepatic biliary dilatation with pancreatic duct dilatation.   Findings also consistent with acute pancreatitis  Gallbladder wall thickening also noted  Continuing LR but decreasing rate  Continuing pantoprazole  Continuing analgesia as needed  General surgery following, appreciate your help  For cholecystectomy tomorrow  N.p.o. after midnight          Seasonal allergies  Stable  Continue cetirizine  With complaints of sinus headache, will start small dose of oxycodone  Patient reminded she can take acetaminophen  Saline nasal spray    Anxiety  She does have alprazolam listed on her home medication list, I did review the , she does not have any active prescriptions for this  Will have some alprazolam available inpatient if she needs it        Code status: FULL    DVT prophylaxis: LMWH    Care Plan discussed with: Patient/Family and Nurse     Anticipated Disposition: Home w/Family     Anticipated Discharge: Depending on surgery. Likely discharge Monday versus Tuesday     Hospital Problems  Date Reviewed: 3/1/2015          Codes Class Noted POA    Pancreatitis ICD-10-CM: K85.90  ICD-9-CM: 387.1  4/29/2022 Unknown        Acute pancreatitis ICD-10-CM: K85.90  ICD-9-CM: 632.1  4/27/2022 Unknown                Review of Systems:   A comprehensive review of systems was negative except for that written in the HPI. Vital Signs:    Last 24hrs VS reviewed since prior progress note. Most recent are:  Visit Vitals  /77   Pulse 75   Temp 98 °F (36.7 °C)   Resp 16   Ht 5' 2\" (1.575 m)   Wt 65.8 kg (145 lb)   SpO2 98%   BMI 26.52 kg/m²         Intake/Output Summary (Last 24 hours) at 5/1/2022 1110  Last data filed at 4/30/2022 1757  Gross per 24 hour   Intake 240 ml   Output    Net 240 ml        Physical Examination:     I had a face to face encounter with this patient and independently examined them on 5/1/2022 as outlined below:          Constitutional:  No acute distress, cooperative, pleasant    ENT:  Oral mucosa moist, oropharynx benign. Resp:  CTA bilaterally. No wheezing/rhonchi/rales.  No accessory muscle use   CV:  Regular rhythm, normal rate, no murmurs, gallops, rubs    GI:  Soft, non distended, non tender. normoactive bowel sounds, no hepatosplenomegaly     Musculoskeletal:  No edema, warm, 2+ pulses throughout    Neurologic:  Moves all extremities. AAOx3, CN II-XII reviewed            Data Review:    Review and/or order of clinical lab test  Review and/or order of tests in the radiology section of [facility]  I personally reviewed  Image      Labs:     Recent Labs     05/01/22 0130 04/30/22  0446   WBC 7.4 7.3   HGB 12.2 12.0   HCT 35.7 36.8    174     Recent Labs     05/01/22 0130 04/30/22 0446 04/29/22  0321    145 142   K 3.6 3.7 3.4*   * 112* 111*   CO2 25 26 23   BUN 5* 6 11   CREA 0.74 0.74 0.70   GLU 92 80 100   CA 8.5 8.4* 8.2*     Recent Labs     05/01/22 0130 04/30/22 0446 04/29/22  0321   ALT 37 40 45   AP 60 58 57   TBILI 0.6 0.7 0.6   TP 5.9* 5.6* 6.1*   ALB 3.2* 3.2* 3.1*   GLOB 2.7 2.4 3.0   LPSE 762* 1,162* 1,578*     No results for input(s): INR, PTP, APTT, INREXT, INREXT in the last 72 hours. No results for input(s): FE, TIBC, PSAT, FERR in the last 72 hours. No results found for: FOL, RBCF   No results for input(s): PH, PCO2, PO2 in the last 72 hours. No results for input(s): CPK, CKNDX, TROIQ in the last 72 hours.     No lab exists for component: CPKMB  Lab Results   Component Value Date/Time    Cholesterol, total 222 (H) 01/21/2014 09:46 AM    HDL Cholesterol 111 01/21/2014 09:46 AM    LDL, calculated 98 01/21/2014 09:46 AM    Triglyceride 63 01/21/2014 09:46 AM     No results found for: GLUCPOC  Lab Results   Component Value Date/Time    Color DARK YELLOW 04/27/2022 12:41 PM    Appearance CLEAR 04/27/2022 12:41 PM    Specific gravity 1.023 04/27/2022 12:41 PM    pH (UA) 6.0 04/27/2022 12:41 PM    Protein TRACE (A) 04/27/2022 12:41 PM    Glucose Negative 04/27/2022 12:41 PM    Ketone TRACE (A) 04/27/2022 12:41 PM    Bilirubin Negative 04/27/2022 12:41 PM    Urobilinogen 1.0 04/27/2022 12:41 PM    Nitrites Negative 04/27/2022 12:41 PM    Leukocyte Esterase TRACE (A) 04/27/2022 12:41 PM    Epithelial cells FEW 04/27/2022 12:41 PM    Bacteria Negative 04/27/2022 12:41 PM    WBC 0-4 04/27/2022 12:41 PM    RBC 0-5 04/27/2022 12:41 PM         Medications Reviewed:     Current Facility-Administered Medications   Medication Dose Route Frequency    oxyCODONE IR (ROXICODONE) tablet 5 mg  5 mg Oral Q6H PRN    sodium chloride (OCEAN) 0.65 % nasal squeeze bottle 2 Spray  2 Spray Both Nostrils Q2H PRN    benzocaine-menthoL (CEPACOL) lozenge 1 Lozenge  1 Lozenge Mucous Membrane PRN    lactated Ringers infusion  100 mL/hr IntraVENous CONTINUOUS    prochlorperazine (COMPAZINE) with saline injection 5 mg  5 mg IntraVENous Q6H PRN    sodium chloride (NS) flush 5-40 mL  5-40 mL IntraVENous Q8H    sodium chloride (NS) flush 5-40 mL  5-40 mL IntraVENous PRN    cetirizine (ZYRTEC) tablet 10 mg  10 mg Oral DAILY    ALPRAZolam (XANAX) tablet 0.5 mg  0.5 mg Oral Q8H PRN    sodium chloride (NS) flush 5-40 mL  5-40 mL IntraVENous Q8H    sodium chloride (NS) flush 5-40 mL  5-40 mL IntraVENous PRN    acetaminophen (TYLENOL) tablet 650 mg  650 mg Oral Q6H PRN    Or    acetaminophen (TYLENOL) suppository 650 mg  650 mg Rectal Q6H PRN    polyethylene glycol (MIRALAX) packet 17 g  17 g Oral DAILY PRN    ondansetron (ZOFRAN) injection 4 mg  4 mg IntraVENous Q6H PRN    enoxaparin (LOVENOX) injection 40 mg  40 mg SubCUTAneous DAILY    HYDROmorphone (DILAUDID) injection 0.5 mg  0.5 mg IntraVENous Q4H PRN    pantoprazole (PROTONIX) injection 40 mg  40 mg IntraVENous Q12H    And    sodium chloride (NS) flush 10 mL  10 mL IntraVENous Q12H     ______________________________________________________________________  EXPECTED LENGTH OF STAY: 2d 9h  ACTUAL LENGTH OF STAY:          2                 eTn Alvarado NP

## 2022-05-01 NOTE — PROGRESS NOTES
Progress Note    Patient: Brook Forrest MRN: [id number]  SSN: [id number]    YOB: 1955  Age: 74 y.o. Sex: female      Admit Date: 2022    3 Days Post-Op    Procedure:  Procedure(s):  ESOPHAGOGASTRODUODENOSCOPY (EGD)  ENDOSCOPIC ULTRASOUND (EUS)    Subjective:     No acute surgical issues. Pt reported feeling nauseated today. Lipase is continuing to improved but no normalized yet. Objective:     Visit Vitals  /77   Pulse 75   Temp 98 °F (36.7 °C)   Resp 16   Ht 5' 2\" (1.575 m)   Wt 145 lb (65.8 kg)   SpO2 98%   BMI 26.52 kg/m²       Temp (24hrs), Av °F (36.7 °C), Min:97.9 °F (36.6 °C), Max:98.1 °F (36.7 °C)        Physical Exam:    Gen:  NAD  Pulm:  Unlabored  Abd:  S/ND/mild TTP in RUQ    Recent Results (from the past 24 hour(s))   CBC WITH AUTOMATED DIFF    Collection Time: 22  1:30 AM   Result Value Ref Range    WBC 7.4 3.6 - 11.0 K/uL    RBC 3.86 3.80 - 5.20 M/uL    HGB 12.2 11.5 - 16.0 g/dL    HCT 35.7 35.0 - 47.0 %    MCV 92.5 80.0 - 99.0 FL    MCH 31.6 26.0 - 34.0 PG    MCHC 34.2 30.0 - 36.5 g/dL    RDW 12.8 11.5 - 14.5 %    PLATELET 266 748 - 759 K/uL    MPV 11.4 8.9 - 12.9 FL    NRBC 0.0 0  WBC    ABSOLUTE NRBC 0.00 0.00 - 0.01 K/uL    NEUTROPHILS 67 32 - 75 %    LYMPHOCYTES 18 12 - 49 %    MONOCYTES 9 5 - 13 %    EOSINOPHILS 4 0 - 7 %    BASOPHILS 1 0 - 1 %    IMMATURE GRANULOCYTES 1 (H) 0.0 - 0.5 %    ABS. NEUTROPHILS 5.0 1.8 - 8.0 K/UL    ABS. LYMPHOCYTES 1.3 0.8 - 3.5 K/UL    ABS. MONOCYTES 0.6 0.0 - 1.0 K/UL    ABS. EOSINOPHILS 0.3 0.0 - 0.4 K/UL    ABS. BASOPHILS 0.1 0.0 - 0.1 K/UL    ABS. IMM.  GRANS. 0.1 (H) 0.00 - 0.04 K/UL    DF AUTOMATED     METABOLIC PANEL, COMPREHENSIVE    Collection Time: 22  1:30 AM   Result Value Ref Range    Sodium 144 136 - 145 mmol/L    Potassium 3.6 3.5 - 5.1 mmol/L    Chloride 110 (H) 97 - 108 mmol/L    CO2 25 21 - 32 mmol/L    Anion gap 9 5 - 15 mmol/L    Glucose 92 65 - 100 mg/dL    BUN 5 (L) 6 - 20 MG/DL Creatinine 0.74 0.55 - 1.02 MG/DL    BUN/Creatinine ratio 7 (L) 12 - 20      GFR est AA >60 >60 ml/min/1.73m2    GFR est non-AA >60 >60 ml/min/1.73m2    Calcium 8.5 8.5 - 10.1 MG/DL    Bilirubin, total 0.6 0.2 - 1.0 MG/DL    ALT (SGPT) 37 12 - 78 U/L    AST (SGOT) 22 15 - 37 U/L    Alk. phosphatase 60 45 - 117 U/L    Protein, total 5.9 (L) 6.4 - 8.2 g/dL    Albumin 3.2 (L) 3.5 - 5.0 g/dL    Globulin 2.7 2.0 - 4.0 g/dL    A-G Ratio 1.2 1.1 - 2.2     LIPASE    Collection Time: 05/01/22  1:30 AM   Result Value Ref Range    Lipase 762 (H) 73 - 393 U/L         Assessment:     Hospital Problems  Date Reviewed: 3/1/2015          Codes Class Noted POA    Pancreatitis ICD-10-CM: K85.90  ICD-9-CM: 471.3  4/29/2022 Unknown        Acute pancreatitis ICD-10-CM: K85.90  ICD-9-CM: 460.8  4/27/2022 Unknown              Plan/Recommendations/Medical Decision Making:     - Gallstone pancreatitis:  Resolving.    - Continue to monitor lipase level  - Full liquid diet  - NPO after midnight for lap pool tomorrow  - Pain control  - Out of bed.   Encourage ambulation

## 2022-05-02 ENCOUNTER — APPOINTMENT (OUTPATIENT)
Dept: GENERAL RADIOLOGY | Age: 67
DRG: 418 | End: 2022-05-02
Attending: INTERNAL MEDICINE
Payer: MEDICARE

## 2022-05-02 ENCOUNTER — ANESTHESIA EVENT (OUTPATIENT)
Dept: SURGERY | Age: 67
DRG: 418 | End: 2022-05-02
Payer: MEDICARE

## 2022-05-02 ENCOUNTER — ANESTHESIA (OUTPATIENT)
Dept: SURGERY | Age: 67
DRG: 418 | End: 2022-05-02
Payer: MEDICARE

## 2022-05-02 LAB
ALBUMIN SERPL-MCNC: 3.3 G/DL (ref 3.5–5)
ALBUMIN/GLOB SERPL: 1 {RATIO} (ref 1.1–2.2)
ALP SERPL-CCNC: 63 U/L (ref 45–117)
ALT SERPL-CCNC: 42 U/L (ref 12–78)
ANION GAP SERPL CALC-SCNC: 8 MMOL/L (ref 5–15)
AST SERPL-CCNC: 32 U/L (ref 15–37)
BASOPHILS # BLD: 0.1 K/UL (ref 0–0.1)
BASOPHILS NFR BLD: 1 % (ref 0–1)
BILIRUB SERPL-MCNC: 0.5 MG/DL (ref 0.2–1)
BUN SERPL-MCNC: 4 MG/DL (ref 6–20)
BUN/CREAT SERPL: 5 (ref 12–20)
CALCIUM SERPL-MCNC: 8.8 MG/DL (ref 8.5–10.1)
CHLORIDE SERPL-SCNC: 109 MMOL/L (ref 97–108)
CO2 SERPL-SCNC: 25 MMOL/L (ref 21–32)
CREAT SERPL-MCNC: 0.84 MG/DL (ref 0.55–1.02)
DIFFERENTIAL METHOD BLD: ABNORMAL
EOSINOPHIL # BLD: 0.3 K/UL (ref 0–0.4)
EOSINOPHIL NFR BLD: 4 % (ref 0–7)
ERYTHROCYTE [DISTWIDTH] IN BLOOD BY AUTOMATED COUNT: 12.8 % (ref 11.5–14.5)
GLOBULIN SER CALC-MCNC: 3.4 G/DL (ref 2–4)
GLUCOSE SERPL-MCNC: 99 MG/DL (ref 65–100)
HCT VFR BLD AUTO: 35.3 % (ref 35–47)
HGB BLD-MCNC: 11.9 G/DL (ref 11.5–16)
IMM GRANULOCYTES # BLD AUTO: 0.1 K/UL (ref 0–0.04)
IMM GRANULOCYTES NFR BLD AUTO: 2 % (ref 0–0.5)
LIPASE SERPL-CCNC: 502 U/L (ref 73–393)
LYMPHOCYTES # BLD: 1.1 K/UL (ref 0.8–3.5)
LYMPHOCYTES NFR BLD: 16 % (ref 12–49)
MCH RBC QN AUTO: 31.2 PG (ref 26–34)
MCHC RBC AUTO-ENTMCNC: 33.7 G/DL (ref 30–36.5)
MCV RBC AUTO: 92.4 FL (ref 80–99)
MONOCYTES # BLD: 0.7 K/UL (ref 0–1)
MONOCYTES NFR BLD: 10 % (ref 5–13)
NEUTS SEG # BLD: 4.8 K/UL (ref 1.8–8)
NEUTS SEG NFR BLD: 67 % (ref 32–75)
NRBC # BLD: 0 K/UL (ref 0–0.01)
NRBC BLD-RTO: 0 PER 100 WBC
PLATELET # BLD AUTO: 205 K/UL (ref 150–400)
PMV BLD AUTO: 10.9 FL (ref 8.9–12.9)
POTASSIUM SERPL-SCNC: 3.6 MMOL/L (ref 3.5–5.1)
PROT SERPL-MCNC: 6.7 G/DL (ref 6.4–8.2)
RBC # BLD AUTO: 3.82 M/UL (ref 3.8–5.2)
SODIUM SERPL-SCNC: 142 MMOL/L (ref 136–145)
WBC # BLD AUTO: 7.1 K/UL (ref 3.6–11)

## 2022-05-02 PROCEDURE — 77030040922 HC BLNKT HYPOTHRM STRY -A

## 2022-05-02 PROCEDURE — 83690 ASSAY OF LIPASE: CPT

## 2022-05-02 PROCEDURE — 74011250636 HC RX REV CODE- 250/636: Performed by: SURGERY

## 2022-05-02 PROCEDURE — 74011000250 HC RX REV CODE- 250: Performed by: SURGERY

## 2022-05-02 PROCEDURE — 74011250637 HC RX REV CODE- 250/637: Performed by: HOSPITALIST

## 2022-05-02 PROCEDURE — 77030010031 HC SCIS ENDOSC MPLR J&J -C: Performed by: SURGERY

## 2022-05-02 PROCEDURE — 76010000149 HC OR TIME 1 TO 1.5 HR: Performed by: SURGERY

## 2022-05-02 PROCEDURE — 0FT44ZZ RESECTION OF GALLBLADDER, PERCUTANEOUS ENDOSCOPIC APPROACH: ICD-10-PCS | Performed by: SURGERY

## 2022-05-02 PROCEDURE — 74011000250 HC RX REV CODE- 250: Performed by: INTERNAL MEDICINE

## 2022-05-02 PROCEDURE — C9113 INJ PANTOPRAZOLE SODIUM, VIA: HCPCS | Performed by: HOSPITALIST

## 2022-05-02 PROCEDURE — 74011250636 HC RX REV CODE- 250/636: Performed by: NURSE ANESTHETIST, CERTIFIED REGISTERED

## 2022-05-02 PROCEDURE — 77030013079 HC BLNKT BAIR HGGR 3M -A: Performed by: STUDENT IN AN ORGANIZED HEALTH CARE EDUCATION/TRAINING PROGRAM

## 2022-05-02 PROCEDURE — 77030039895 HC SYST SMK EVAC LAP COVD -B: Performed by: SURGERY

## 2022-05-02 PROCEDURE — 74011000250 HC RX REV CODE- 250: Performed by: NURSE ANESTHETIST, CERTIFIED REGISTERED

## 2022-05-02 PROCEDURE — 85025 COMPLETE CBC W/AUTO DIFF WBC: CPT

## 2022-05-02 PROCEDURE — BF111ZZ FLUOROSCOPY OF BILIARY AND PANCREATIC DUCTS USING LOW OSMOLAR CONTRAST: ICD-10-PCS | Performed by: SURGERY

## 2022-05-02 PROCEDURE — 77030026438 HC STYL ET INTUB CARD -A: Performed by: STUDENT IN AN ORGANIZED HEALTH CARE EDUCATION/TRAINING PROGRAM

## 2022-05-02 PROCEDURE — 47563 LAPARO CHOLECYSTECTOMY/GRAPH: CPT | Performed by: SURGERY

## 2022-05-02 PROCEDURE — 77030002895 HC DEV VASC CLOSR COVD -B: Performed by: SURGERY

## 2022-05-02 PROCEDURE — 77030008756 HC TU IRR SUC STRY -B: Performed by: SURGERY

## 2022-05-02 PROCEDURE — 36415 COLL VENOUS BLD VENIPUNCTURE: CPT

## 2022-05-02 PROCEDURE — 77030038093 HC CATH CHOLGM OP PMI PRGV-C: Performed by: SURGERY

## 2022-05-02 PROCEDURE — 88304 TISSUE EXAM BY PATHOLOGIST: CPT

## 2022-05-02 PROCEDURE — 74011000636 HC RX REV CODE- 636: Performed by: SURGERY

## 2022-05-02 PROCEDURE — 77030040361 HC SLV COMPR DVT MDII -B: Performed by: SURGERY

## 2022-05-02 PROCEDURE — 77030031139 HC SUT VCRL2 J&J -A: Performed by: SURGERY

## 2022-05-02 PROCEDURE — 77030004813 HC CATH CHOLGM TELE -B: Performed by: SURGERY

## 2022-05-02 PROCEDURE — 99231 SBSQ HOSP IP/OBS SF/LOW 25: CPT | Performed by: SURGERY

## 2022-05-02 PROCEDURE — 77030008608 HC TRCR ENDOSC SMTH AMR -B: Performed by: SURGERY

## 2022-05-02 PROCEDURE — 76210000016 HC OR PH I REC 1 TO 1.5 HR: Performed by: SURGERY

## 2022-05-02 PROCEDURE — 77030012770 HC TRCR OPT FX AMR -B: Performed by: SURGERY

## 2022-05-02 PROCEDURE — 74011000250 HC RX REV CODE- 250: Performed by: HOSPITALIST

## 2022-05-02 PROCEDURE — 77030002933 HC SUT MCRYL J&J -A: Performed by: SURGERY

## 2022-05-02 PROCEDURE — 74300 X-RAY BILE DUCTS/PANCREAS: CPT

## 2022-05-02 PROCEDURE — 77030009403 HC ELECTRD ENDO MEGA -B: Performed by: SURGERY

## 2022-05-02 PROCEDURE — C9113 INJ PANTOPRAZOLE SODIUM, VIA: HCPCS | Performed by: SURGERY

## 2022-05-02 PROCEDURE — 77030008684 HC TU ET CUF COVD -B: Performed by: STUDENT IN AN ORGANIZED HEALTH CARE EDUCATION/TRAINING PROGRAM

## 2022-05-02 PROCEDURE — 77030010513 HC APPL CLP LIG J&J -C: Performed by: SURGERY

## 2022-05-02 PROCEDURE — 77030020829: Performed by: SURGERY

## 2022-05-02 PROCEDURE — 74011250636 HC RX REV CODE- 250/636: Performed by: NURSE PRACTITIONER

## 2022-05-02 PROCEDURE — 80053 COMPREHEN METABOLIC PANEL: CPT

## 2022-05-02 PROCEDURE — 77030037892: Performed by: SURGERY

## 2022-05-02 PROCEDURE — 74011250636 HC RX REV CODE- 250/636: Performed by: HOSPITALIST

## 2022-05-02 PROCEDURE — 77030002967 HC SUT PDS J&J -B: Performed by: SURGERY

## 2022-05-02 PROCEDURE — 65270000029 HC RM PRIVATE

## 2022-05-02 PROCEDURE — 76060000034 HC ANESTHESIA 1.5 TO 2 HR: Performed by: SURGERY

## 2022-05-02 PROCEDURE — 77030010507 HC ADH SKN DERMBND J&J -B: Performed by: SURGERY

## 2022-05-02 PROCEDURE — 2709999900 HC NON-CHARGEABLE SUPPLY: Performed by: SURGERY

## 2022-05-02 PROCEDURE — 77030037032 HC INSRT SCIS CLICKLLINE DISP STOR -B: Performed by: SURGERY

## 2022-05-02 RX ORDER — HYDROMORPHONE HYDROCHLORIDE 1 MG/ML
0.2 INJECTION, SOLUTION INTRAMUSCULAR; INTRAVENOUS; SUBCUTANEOUS
Status: CANCELLED | OUTPATIENT
Start: 2022-05-02

## 2022-05-02 RX ORDER — SODIUM CHLORIDE 9 MG/ML
25 INJECTION, SOLUTION INTRAVENOUS CONTINUOUS
Status: DISCONTINUED | OUTPATIENT
Start: 2022-05-02 | End: 2022-05-02 | Stop reason: HOSPADM

## 2022-05-02 RX ORDER — LIDOCAINE HYDROCHLORIDE 10 MG/ML
0.1 INJECTION, SOLUTION EPIDURAL; INFILTRATION; INTRACAUDAL; PERINEURAL AS NEEDED
Status: DISCONTINUED | OUTPATIENT
Start: 2022-05-02 | End: 2022-05-02 | Stop reason: HOSPADM

## 2022-05-02 RX ORDER — DEXAMETHASONE SODIUM PHOSPHATE 4 MG/ML
INJECTION, SOLUTION INTRA-ARTICULAR; INTRALESIONAL; INTRAMUSCULAR; INTRAVENOUS; SOFT TISSUE AS NEEDED
Status: DISCONTINUED | OUTPATIENT
Start: 2022-05-02 | End: 2022-05-02 | Stop reason: HOSPADM

## 2022-05-02 RX ORDER — SODIUM CHLORIDE 0.9 % (FLUSH) 0.9 %
5-40 SYRINGE (ML) INJECTION EVERY 8 HOURS
Status: CANCELLED | OUTPATIENT
Start: 2022-05-02

## 2022-05-02 RX ORDER — OXYCODONE HYDROCHLORIDE 5 MG/1
5 TABLET ORAL AS NEEDED
Status: CANCELLED | OUTPATIENT
Start: 2022-05-02

## 2022-05-02 RX ORDER — GLYCOPYRROLATE 0.2 MG/ML
INJECTION INTRAMUSCULAR; INTRAVENOUS AS NEEDED
Status: DISCONTINUED | OUTPATIENT
Start: 2022-05-02 | End: 2022-05-02 | Stop reason: HOSPADM

## 2022-05-02 RX ORDER — DIPHENHYDRAMINE HYDROCHLORIDE 50 MG/ML
12.5 INJECTION, SOLUTION INTRAMUSCULAR; INTRAVENOUS AS NEEDED
Status: CANCELLED | OUTPATIENT
Start: 2022-05-02 | End: 2022-05-02

## 2022-05-02 RX ORDER — MIDAZOLAM HYDROCHLORIDE 1 MG/ML
0.5 INJECTION, SOLUTION INTRAMUSCULAR; INTRAVENOUS
Status: CANCELLED | OUTPATIENT
Start: 2022-05-02

## 2022-05-02 RX ORDER — SODIUM CHLORIDE 0.9 % (FLUSH) 0.9 %
5-40 SYRINGE (ML) INJECTION AS NEEDED
Status: CANCELLED | OUTPATIENT
Start: 2022-05-02

## 2022-05-02 RX ORDER — LIDOCAINE HYDROCHLORIDE 20 MG/ML
INJECTION, SOLUTION EPIDURAL; INFILTRATION; INTRACAUDAL; PERINEURAL AS NEEDED
Status: DISCONTINUED | OUTPATIENT
Start: 2022-05-02 | End: 2022-05-02 | Stop reason: HOSPADM

## 2022-05-02 RX ORDER — SODIUM CHLORIDE, SODIUM LACTATE, POTASSIUM CHLORIDE, CALCIUM CHLORIDE 600; 310; 30; 20 MG/100ML; MG/100ML; MG/100ML; MG/100ML
100 INJECTION, SOLUTION INTRAVENOUS CONTINUOUS
Status: DISCONTINUED | OUTPATIENT
Start: 2022-05-02 | End: 2022-05-02 | Stop reason: HOSPADM

## 2022-05-02 RX ORDER — SODIUM CHLORIDE, SODIUM LACTATE, POTASSIUM CHLORIDE, CALCIUM CHLORIDE 600; 310; 30; 20 MG/100ML; MG/100ML; MG/100ML; MG/100ML
25 INJECTION, SOLUTION INTRAVENOUS CONTINUOUS
Status: DISCONTINUED | OUTPATIENT
Start: 2022-05-02 | End: 2022-05-02 | Stop reason: HOSPADM

## 2022-05-02 RX ORDER — ONDANSETRON 2 MG/ML
INJECTION INTRAMUSCULAR; INTRAVENOUS AS NEEDED
Status: DISCONTINUED | OUTPATIENT
Start: 2022-05-02 | End: 2022-05-02 | Stop reason: HOSPADM

## 2022-05-02 RX ORDER — FENTANYL CITRATE 50 UG/ML
INJECTION, SOLUTION INTRAMUSCULAR; INTRAVENOUS AS NEEDED
Status: DISCONTINUED | OUTPATIENT
Start: 2022-05-02 | End: 2022-05-02 | Stop reason: HOSPADM

## 2022-05-02 RX ORDER — PROPOFOL 10 MG/ML
INJECTION, EMULSION INTRAVENOUS AS NEEDED
Status: DISCONTINUED | OUTPATIENT
Start: 2022-05-02 | End: 2022-05-02 | Stop reason: HOSPADM

## 2022-05-02 RX ORDER — MIDAZOLAM HYDROCHLORIDE 1 MG/ML
1 INJECTION, SOLUTION INTRAMUSCULAR; INTRAVENOUS AS NEEDED
Status: DISCONTINUED | OUTPATIENT
Start: 2022-05-02 | End: 2022-05-02 | Stop reason: HOSPADM

## 2022-05-02 RX ORDER — ROCURONIUM BROMIDE 10 MG/ML
INJECTION, SOLUTION INTRAVENOUS AS NEEDED
Status: DISCONTINUED | OUTPATIENT
Start: 2022-05-02 | End: 2022-05-02 | Stop reason: HOSPADM

## 2022-05-02 RX ORDER — ONDANSETRON 2 MG/ML
4 INJECTION INTRAMUSCULAR; INTRAVENOUS AS NEEDED
Status: CANCELLED | OUTPATIENT
Start: 2022-05-02

## 2022-05-02 RX ORDER — SODIUM CHLORIDE 0.9 % (FLUSH) 0.9 %
5-40 SYRINGE (ML) INJECTION AS NEEDED
Status: DISCONTINUED | OUTPATIENT
Start: 2022-05-02 | End: 2022-05-02 | Stop reason: HOSPADM

## 2022-05-02 RX ORDER — ACETAMINOPHEN 325 MG/1
650 TABLET ORAL ONCE
Status: DISCONTINUED | OUTPATIENT
Start: 2022-05-02 | End: 2022-05-02 | Stop reason: HOSPADM

## 2022-05-02 RX ORDER — FENTANYL CITRATE 50 UG/ML
50 INJECTION, SOLUTION INTRAMUSCULAR; INTRAVENOUS AS NEEDED
Status: DISCONTINUED | OUTPATIENT
Start: 2022-05-02 | End: 2022-05-02 | Stop reason: HOSPADM

## 2022-05-02 RX ORDER — NEOSTIGMINE METHYLSULFATE 1 MG/ML
INJECTION, SOLUTION INTRAVENOUS AS NEEDED
Status: DISCONTINUED | OUTPATIENT
Start: 2022-05-02 | End: 2022-05-02 | Stop reason: HOSPADM

## 2022-05-02 RX ORDER — BUPIVACAINE HYDROCHLORIDE 5 MG/ML
50 INJECTION, SOLUTION EPIDURAL; INTRACAUDAL ONCE
Status: COMPLETED | OUTPATIENT
Start: 2022-05-02 | End: 2022-05-02

## 2022-05-02 RX ORDER — FENTANYL CITRATE 50 UG/ML
25 INJECTION, SOLUTION INTRAMUSCULAR; INTRAVENOUS
Status: CANCELLED | OUTPATIENT
Start: 2022-05-02

## 2022-05-02 RX ORDER — MIDAZOLAM HYDROCHLORIDE 1 MG/ML
INJECTION, SOLUTION INTRAMUSCULAR; INTRAVENOUS AS NEEDED
Status: DISCONTINUED | OUTPATIENT
Start: 2022-05-02 | End: 2022-05-02 | Stop reason: HOSPADM

## 2022-05-02 RX ORDER — SUCCINYLCHOLINE CHLORIDE 20 MG/ML
INJECTION INTRAMUSCULAR; INTRAVENOUS AS NEEDED
Status: DISCONTINUED | OUTPATIENT
Start: 2022-05-02 | End: 2022-05-02 | Stop reason: HOSPADM

## 2022-05-02 RX ORDER — HYDROMORPHONE HYDROCHLORIDE 2 MG/ML
INJECTION, SOLUTION INTRAMUSCULAR; INTRAVENOUS; SUBCUTANEOUS AS NEEDED
Status: DISCONTINUED | OUTPATIENT
Start: 2022-05-02 | End: 2022-05-02 | Stop reason: HOSPADM

## 2022-05-02 RX ORDER — PHENYLEPHRINE HCL IN 0.9% NACL 0.4MG/10ML
SYRINGE (ML) INTRAVENOUS AS NEEDED
Status: DISCONTINUED | OUTPATIENT
Start: 2022-05-02 | End: 2022-05-02 | Stop reason: HOSPADM

## 2022-05-02 RX ORDER — ROPIVACAINE HYDROCHLORIDE 5 MG/ML
30 INJECTION, SOLUTION EPIDURAL; INFILTRATION; PERINEURAL AS NEEDED
Status: DISCONTINUED | OUTPATIENT
Start: 2022-05-02 | End: 2022-05-02 | Stop reason: HOSPADM

## 2022-05-02 RX ORDER — HYDROMORPHONE HYDROCHLORIDE 1 MG/ML
1 INJECTION, SOLUTION INTRAMUSCULAR; INTRAVENOUS; SUBCUTANEOUS
Status: DISCONTINUED | OUTPATIENT
Start: 2022-05-02 | End: 2022-05-03 | Stop reason: HOSPADM

## 2022-05-02 RX ORDER — MORPHINE SULFATE 2 MG/ML
2 INJECTION, SOLUTION INTRAMUSCULAR; INTRAVENOUS
Status: CANCELLED | OUTPATIENT
Start: 2022-05-02

## 2022-05-02 RX ORDER — SODIUM CHLORIDE 0.9 % (FLUSH) 0.9 %
5-40 SYRINGE (ML) INJECTION EVERY 8 HOURS
Status: DISCONTINUED | OUTPATIENT
Start: 2022-05-02 | End: 2022-05-02 | Stop reason: HOSPADM

## 2022-05-02 RX ORDER — KETAMINE HYDROCHLORIDE 10 MG/ML
INJECTION, SOLUTION INTRAMUSCULAR; INTRAVENOUS AS NEEDED
Status: DISCONTINUED | OUTPATIENT
Start: 2022-05-02 | End: 2022-05-02 | Stop reason: HOSPADM

## 2022-05-02 RX ADMIN — HYDROMORPHONE HYDROCHLORIDE 1 MG: 1 INJECTION, SOLUTION INTRAMUSCULAR; INTRAVENOUS; SUBCUTANEOUS at 16:25

## 2022-05-02 RX ADMIN — SUCCINYLCHOLINE CHLORIDE 120 MG: 20 INJECTION, SOLUTION INTRAMUSCULAR; INTRAVENOUS at 11:14

## 2022-05-02 RX ADMIN — PROPOFOL 150 MG: 10 INJECTION, EMULSION INTRAVENOUS at 11:14

## 2022-05-02 RX ADMIN — SODIUM CHLORIDE, PRESERVATIVE FREE 10 ML: 5 INJECTION INTRAVENOUS at 06:25

## 2022-05-02 RX ADMIN — SODIUM CHLORIDE, PRESERVATIVE FREE 10 ML: 5 INJECTION INTRAVENOUS at 16:25

## 2022-05-02 RX ADMIN — SODIUM CHLORIDE, PRESERVATIVE FREE 10 ML: 5 INJECTION INTRAVENOUS at 21:32

## 2022-05-02 RX ADMIN — Medication 20 MG: at 11:14

## 2022-05-02 RX ADMIN — CEFOXITIN SODIUM 2 G: 2 POWDER, FOR SOLUTION INTRAVENOUS at 11:27

## 2022-05-02 RX ADMIN — Medication 40 MCG: at 11:50

## 2022-05-02 RX ADMIN — CETIRIZINE HYDROCHLORIDE 10 MG: 10 TABLET, FILM COATED ORAL at 09:09

## 2022-05-02 RX ADMIN — FENTANYL CITRATE 100 MCG: 50 INJECTION, SOLUTION INTRAMUSCULAR; INTRAVENOUS at 11:14

## 2022-05-02 RX ADMIN — SODIUM CHLORIDE, PRESERVATIVE FREE 5 MG: 5 INJECTION INTRAVENOUS at 21:27

## 2022-05-02 RX ADMIN — HYDROMORPHONE HYDROCHLORIDE 1 MG: 2 INJECTION, SOLUTION INTRAMUSCULAR; INTRAVENOUS; SUBCUTANEOUS at 12:22

## 2022-05-02 RX ADMIN — SODIUM CHLORIDE, POTASSIUM CHLORIDE, SODIUM LACTATE AND CALCIUM CHLORIDE 100 ML/HR: 600; 310; 30; 20 INJECTION, SOLUTION INTRAVENOUS at 08:36

## 2022-05-02 RX ADMIN — ROCURONIUM BROMIDE 25 MG: 10 SOLUTION INTRAVENOUS at 11:17

## 2022-05-02 RX ADMIN — ONDANSETRON HYDROCHLORIDE 4 MG: 2 INJECTION, SOLUTION INTRAMUSCULAR; INTRAVENOUS at 11:14

## 2022-05-02 RX ADMIN — LIDOCAINE HYDROCHLORIDE 50 MG: 20 INJECTION, SOLUTION EPIDURAL; INFILTRATION; INTRACAUDAL; PERINEURAL at 11:14

## 2022-05-02 RX ADMIN — SODIUM CHLORIDE, POTASSIUM CHLORIDE, SODIUM LACTATE AND CALCIUM CHLORIDE 100 ML/HR: 600; 310; 30; 20 INJECTION, SOLUTION INTRAVENOUS at 13:35

## 2022-05-02 RX ADMIN — DEXAMETHASONE SODIUM PHOSPHATE 4 MG: 4 INJECTION, SOLUTION INTRAMUSCULAR; INTRAVENOUS at 11:14

## 2022-05-02 RX ADMIN — PANTOPRAZOLE SODIUM 40 MG: 40 INJECTION, POWDER, FOR SOLUTION INTRAVENOUS at 09:09

## 2022-05-02 RX ADMIN — SODIUM CHLORIDE, PRESERVATIVE FREE 10 ML: 5 INJECTION INTRAVENOUS at 09:10

## 2022-05-02 RX ADMIN — MIDAZOLAM 2 MG: 1 INJECTION INTRAMUSCULAR; INTRAVENOUS at 11:04

## 2022-05-02 RX ADMIN — SODIUM CHLORIDE, PRESERVATIVE FREE 10 ML: 5 INJECTION INTRAVENOUS at 16:28

## 2022-05-02 RX ADMIN — HYDROMORPHONE HYDROCHLORIDE 0.4 MG: 2 INJECTION, SOLUTION INTRAMUSCULAR; INTRAVENOUS; SUBCUTANEOUS at 12:32

## 2022-05-02 RX ADMIN — HYDROMORPHONE HYDROCHLORIDE 0.6 MG: 2 INJECTION, SOLUTION INTRAMUSCULAR; INTRAVENOUS; SUBCUTANEOUS at 12:37

## 2022-05-02 RX ADMIN — NEOSTIGMINE METHYLSULFATE 3 MG: 1 INJECTION, SOLUTION INTRAVENOUS at 12:16

## 2022-05-02 RX ADMIN — SODIUM CHLORIDE, POTASSIUM CHLORIDE, SODIUM LACTATE AND CALCIUM CHLORIDE 100 ML/HR: 600; 310; 30; 20 INJECTION, SOLUTION INTRAVENOUS at 21:18

## 2022-05-02 RX ADMIN — PROPOFOL 50 MG: 10 INJECTION, EMULSION INTRAVENOUS at 12:22

## 2022-05-02 RX ADMIN — ROCURONIUM BROMIDE 5 MG: 10 SOLUTION INTRAVENOUS at 11:14

## 2022-05-02 RX ADMIN — GLYCOPYRROLATE 0.4 MG: 0.2 INJECTION, SOLUTION INTRAMUSCULAR; INTRAVENOUS at 12:16

## 2022-05-02 RX ADMIN — PANTOPRAZOLE SODIUM 40 MG: 40 INJECTION, POWDER, FOR SOLUTION INTRAVENOUS at 21:32

## 2022-05-02 RX ADMIN — ONDANSETRON 4 MG: 2 INJECTION INTRAMUSCULAR; INTRAVENOUS at 16:25

## 2022-05-02 NOTE — PROGRESS NOTES
[address] [facility]  Hospitalist Group                                                                                          Hospitalist Progress Note  Brian Avelar NP  Answering service: [phone] from in house phone        Date of Service:  2022  NAME:  Hemanth Vazquez  :  1955  MRN:  [id number]      Admission Summary:   Per H&P, This is a 76-year-old female with past medical history of anxiety, anemia. She comes over here because of abdominal pain that started this morning. The patient says that this morning when she woke up, she was perfectly at her baseline. She started having breakfast and soon after she started having epigastric abdominal pain. She states that the pain was about 10/10 in intensity, sharp, nonradiating in the epigastric region associated with nausea and vomiting. In fact, she vomited about 4-5 times. At no point of time did she vomit any blood. She did not have any diarrhea. No fever, cough, headache, dizziness, and no changes in bowel movements. Interval history / Subjective:       Patient seen on morning rounds. Feeling much better today. Assessment & Plan:     Acute pancreatitis  Her leukocytosis is improving, was 20.9 yesterday, it is improved, 10.8 today  T bili and transaminases are within normal limits  Lipase was elevated, greater than 3000 at presentation, improved, 762 today  Gastroenterology following, appreciate recs  Underwent EGD  with endoscopic ultrasound with sludge and small stones in the gallbladder as well as biliary tree ranging in diameter between 4 and 8 mm. MRI/MRCP  with extrahepatic biliary dilatation with pancreatic duct dilatation.   Findings also consistent with acute pancreatitis  Gallbladder wall thickening also noted  Continuing LR for now  Continuing pantoprazole  As needed analgesia  For cholecystectomy today              Seasonal allergies  Stable  Continue cetirizine  Sinus headache improved  Continuing saline nasal spray and acetaminophen        Anxiety  She does have alprazolam listed on her home medication list, I did review the , she does not have any active prescriptions for this  Will have some alprazolam available inpatient if she needs it        Code status: FULL    DVT prophylaxis: LMWH    Care Plan discussed with: Patient/Family, Nurse and      Anticipated Disposition: Home w/Family     Anticipated Discharge: Depending on surgery. Most likely tomorrow morning     Hospital Problems  Date Reviewed: 3/1/2015          Codes Class Noted POA    Pancreatitis ICD-10-CM: K85.90  ICD-9-CM: 841.7  4/29/2022 Unknown        Acute pancreatitis ICD-10-CM: K85.90  ICD-9-CM: 913.8  4/27/2022 Unknown                Review of Systems:   A comprehensive review of systems was negative except for that written in the HPI. Vital Signs:    Last 24hrs VS reviewed since prior progress note. Most recent are:  Visit Vitals  BP (!) 146/83 (BP 1 Location: Right upper arm, BP Patient Position: At rest)   Pulse 73   Temp 98.5 °F (36.9 °C)   Resp 18   Ht 5' 2\" (1.575 m)   Wt 65.8 kg (145 lb)   SpO2 99%   BMI 26.52 kg/m²       No intake or output data in the 24 hours ending 05/02/22 1025     Physical Examination:     I had a face to face encounter with this patient and independently examined them on 5/2/2022 as outlined below:          Constitutional:  No acute distress, cooperative, pleasant    ENT:  Oral mucosa moist, oropharynx benign. Resp:  CTA bilaterally. No wheezing/rhonchi/rales. No accessory muscle use   CV:  Regular rhythm, normal rate, no murmurs, gallops, rubs    GI:  Soft, non distended, non tender. normoactive bowel sounds, no hepatosplenomegaly     Musculoskeletal:  No edema, warm, 2+ pulses throughout    Neurologic:  Moves all extremities.   AAOx3, CN II-XII reviewed            Data Review:    Review and/or order of clinical lab test  Review and/or order of tests in the radiology section of [facility]  I personally reviewed  Image      Labs:     Recent Labs     05/02/22  0620 05/01/22  0130   WBC 7.1 7.4   HGB 11.9 12.2   HCT 35.3 35.7    193     Recent Labs     05/02/22  0302 05/01/22  0130 04/30/22  0446    144 145   K 3.6 3.6 3.7   * 110* 112*   CO2 25 25 26   BUN 4* 5* 6   CREA 0.84 0.74 0.74   GLU 99 92 80   CA 8.8 8.5 8.4*     Recent Labs     05/02/22  0302 05/01/22  0130 04/30/22  0446   ALT 42 37 40   AP 63 60 58   TBILI 0.5 0.6 0.7   TP 6.7 5.9* 5.6*   ALB 3.3* 3.2* 3.2*   GLOB 3.4 2.7 2.4   LPSE 502* 762* 1,162*     No results for input(s): INR, PTP, APTT, INREXT, INREXT in the last 72 hours. No results for input(s): FE, TIBC, PSAT, FERR in the last 72 hours. No results found for: FOL, RBCF   No results for input(s): PH, PCO2, PO2 in the last 72 hours. No results for input(s): CPK, CKNDX, TROIQ in the last 72 hours.     No lab exists for component: CPKMB  Lab Results   Component Value Date/Time    Cholesterol, total 222 (H) 01/21/2014 09:46 AM    HDL Cholesterol 111 01/21/2014 09:46 AM    LDL, calculated 98 01/21/2014 09:46 AM    Triglyceride 63 01/21/2014 09:46 AM     No results found for: GLUCPOC  Lab Results   Component Value Date/Time    Color DARK YELLOW 04/27/2022 12:41 PM    Appearance CLEAR 04/27/2022 12:41 PM    Specific gravity 1.023 04/27/2022 12:41 PM    pH (UA) 6.0 04/27/2022 12:41 PM    Protein TRACE (A) 04/27/2022 12:41 PM    Glucose Negative 04/27/2022 12:41 PM    Ketone TRACE (A) 04/27/2022 12:41 PM    Bilirubin Negative 04/27/2022 12:41 PM    Urobilinogen 1.0 04/27/2022 12:41 PM    Nitrites Negative 04/27/2022 12:41 PM    Leukocyte Esterase TRACE (A) 04/27/2022 12:41 PM    Epithelial cells FEW 04/27/2022 12:41 PM    Bacteria Negative 04/27/2022 12:41 PM    WBC 0-4 04/27/2022 12:41 PM    RBC 0-5 04/27/2022 12:41 PM         Medications Reviewed:     Current Facility-Administered Medications   Medication Dose Route Frequency    cefOXitin (MEFOXIN) 2 g in sterile water (preservative free) 20 mL iv syringe  2 g IntraVENous ON CALL TO OR    oxyCODONE IR (ROXICODONE) tablet 5 mg  5 mg Oral Q6H PRN    sodium chloride (OCEAN) 0.65 % nasal squeeze bottle 2 Spray  2 Spray Both Nostrils Q2H PRN    benzocaine-menthoL (CEPACOL) lozenge 1 Lozenge  1 Lozenge Mucous Membrane PRN    lactated Ringers infusion  100 mL/hr IntraVENous CONTINUOUS    prochlorperazine (COMPAZINE) with saline injection 5 mg  5 mg IntraVENous Q6H PRN    sodium chloride (NS) flush 5-40 mL  5-40 mL IntraVENous Q8H    sodium chloride (NS) flush 5-40 mL  5-40 mL IntraVENous PRN    cetirizine (ZYRTEC) tablet 10 mg  10 mg Oral DAILY    ALPRAZolam (XANAX) tablet 0.5 mg  0.5 mg Oral Q8H PRN    sodium chloride (NS) flush 5-40 mL  5-40 mL IntraVENous Q8H    sodium chloride (NS) flush 5-40 mL  5-40 mL IntraVENous PRN    acetaminophen (TYLENOL) tablet 650 mg  650 mg Oral Q6H PRN    Or    acetaminophen (TYLENOL) suppository 650 mg  650 mg Rectal Q6H PRN    polyethylene glycol (MIRALAX) packet 17 g  17 g Oral DAILY PRN    ondansetron (ZOFRAN) injection 4 mg  4 mg IntraVENous Q6H PRN    enoxaparin (LOVENOX) injection 40 mg  40 mg SubCUTAneous DAILY    HYDROmorphone (DILAUDID) injection 0.5 mg  0.5 mg IntraVENous Q4H PRN    pantoprazole (PROTONIX) injection 40 mg  40 mg IntraVENous Q12H    And    sodium chloride (NS) flush 10 mL  10 mL IntraVENous Q12H     ______________________________________________________________________  EXPECTED LENGTH OF STAY: 2d 9h  ACTUAL LENGTH OF STAY:          3                 Edie Plata NP

## 2022-05-02 NOTE — ANESTHESIA PREPROCEDURE EVALUATION
Relevant Problems   RESPIRATORY SYSTEM   (+) H/O Asthma, Mild Intermittent      NEUROLOGY   (+) H/O Migraines      CARDIOVASCULAR   (+) H/O PVCs (premature ventricular contractions)       Anesthetic History   No history of anesthetic complications            Review of Systems / Medical History  Patient summary reviewed, nursing notes reviewed and pertinent labs reviewed    Pulmonary  Within defined limits          Asthma        Neuro/Psych   Within defined limits      Psychiatric history     Cardiovascular  Within defined limits          Dysrhythmias            GI/Hepatic/Renal  Within defined limits              Endo/Other  Within defined limits      Anemia     Other Findings              Physical Exam    Airway  Mallampati: II  TM Distance: > 6 cm  Neck ROM: normal range of motion   Mouth opening: Normal     Cardiovascular  Regular rate and rhythm,  S1 and S2 normal,  no murmur, click, rub, or gallop             Dental  No notable dental hx       Pulmonary  Breath sounds clear to auscultation               Abdominal  GI exam deferred       Other Findings            Anesthetic Plan    ASA: 2  Anesthesia type: general          Induction: Intravenous  Anesthetic plan and risks discussed with: Patient

## 2022-05-02 NOTE — PROGRESS NOTES
TRANSFER - IN REPORT:    Verbal report received from SHERIE Foley on [facility]  being received from PACU for routine post - op      Report consisted of patients Situation, Background, Assessment and   Recommendations(SBAR). Information from the following report(s) Procedure Summary was reviewed with the receiving nurse. Opportunity for questions and clarification was provided. Assessment completed upon patients arrival to unit and care assumed.

## 2022-05-02 NOTE — PERIOP NOTES
TRANSFER - OUT REPORT:    Verbal report given to Brigid Gonzalez (name) on Rl Tariq  being transferred to   (unit) for routine post - op       Report consisted of patients Situation, Background, Assessment and   Recommendations(SBAR). Time Pre op antibiotic given:1127  Anesthesia Stop time: 6286  Jo Present on Transfer to floor:N/A  Order for Jo on Chart:NM/A  Discharge Prescriptions with Chart:N/A    Information from the following report(s) SBAR, OR Summary, Procedure Summary, Intake/Output and MAR was reviewed with the receiving nurse. Opportunity for questions and clarification was provided. Is the patient on 02? NO       L/Min RA       Other N/A    Is the patient on a monitor? NO    Is the nurse transporting with the patient? NO    Surgical Waiting Area notified of patient's transfer from PACU? NO,  is in her room 635      The following personal items collected during your admission accompanied patient upon transfer:   Dental Appliance: Dental Appliances: None  Vision:    Hearing Aid:    Jewelry: Jewelry: None  Clothing: Clothing: None  Other Valuables:  Other Valuables: None  Valuables sent to safe:

## 2022-05-02 NOTE — PROGRESS NOTES
Feels better; ready to proceed with cholecystectomy. Risks reviewed to include bleeding, infection, bile duct injury, open procedure; also reviewed post-op diet and anticipated hospital course. She understands and desires to proceed. All questions answered.

## 2022-05-02 NOTE — BRIEF OP NOTE
Brief Postoperative Note    Patient: Lindley Babinski  YOB: 1955  MRN: [id number]    Date of Procedure: 5/2/2022     Pre-Op Diagnosis: BILIARY PANCREATITIS    Post-Op Diagnosis: Same as preoperative diagnosis. Procedure(s):  LAPAROSCOPIC CHOLECYSTECTOMY WITH CHOLANGIOGRAM    Surgeon(s):  Kuldip Woods MD    Surgical Assistant: Surg Asst-1: Marko Sommers    Anesthesia: General     Estimated Blood Loss (mL): less than 50     Complications: None    Specimens:   ID Type Source Tests Collected by Time Destination   1 : gallbladder Fresh Gallbladder  Kuldip Woods MD 5/2/2022 1210 Pathology        Implants: * No implants in log *    Drains: * No LDAs found *    Findings: Chronic cholecystitis; dilated duct on cholangiogram, but no filling defect.     Electronically Signed by Gila Mike MD on 5/2/2022 at 12:25 PM

## 2022-05-02 NOTE — ANESTHESIA POSTPROCEDURE EVALUATION
Post-Anesthesia Evaluation and Assessment    Patient: Francie Rick MRN: [id number]  SSN: [id number]    YOB: 1955  Age: 74 y.o. Sex: female      I have evaluated the patient and they are stable and ready for discharge from the PACU. Cardiovascular Function/Vital Signs  Visit Vitals  BP (!) 142/81 (BP 1 Location: Left arm, BP Patient Position: At rest)   Pulse 84   Temp 36.4 °C (97.5 °F)   Resp 16   Ht 5' 2\" (1.575 m)   Wt 65.8 kg (145 lb)   SpO2 92%   BMI 26.52 kg/m²       Patient is status post General anesthesia for Procedure(s):  LAPAROSCOPIC CHOLECYSTECTOMY WITH CHOLANGIOGRAM.    Nausea/Vomiting: None    Postoperative hydration reviewed and adequate. Pain:  Pain Scale 1: Numeric (0 - 10) (05/02/22 1330)  Pain Intensity 1: 4 (05/02/22 1330)   Managed    Neurological Status:   Neuro (WDL): Within Defined Limits (05/02/22 1330)  Neuro  LUE Motor Response: Purposeful (05/02/22 1330)  LLE Motor Response: Purposeful (05/02/22 1330)  RUE Motor Response: Purposeful (05/02/22 1330)  RLE Motor Response: Purposeful (05/02/22 1330)   At baseline    Mental Status, Level of Consciousness: Alert and  oriented to person, place, and time    Pulmonary Status:   O2 Device: None (Room air) (05/02/22 1330)   Adequate oxygenation and airway patent    Complications related to anesthesia: None    Post-anesthesia assessment completed. No concerns    Signed By: Nestor Hess MD     May 2, 2022              Procedure(s):  LAPAROSCOPIC CHOLECYSTECTOMY WITH CHOLANGIOGRAM.    general    <BSHSIANPOST>    INITIAL Post-op Vital signs:   Vitals Value Taken Time   /73 05/02/22 1330   Temp 36.7 °C (98 °F) 05/02/22 1236   Pulse 64 05/02/22 1337   Resp 17 05/02/22 1337   SpO2 99 % 05/02/22 1337   Vitals shown include unvalidated device data.

## 2022-05-03 VITALS
WEIGHT: 145 LBS | OXYGEN SATURATION: 96 % | DIASTOLIC BLOOD PRESSURE: 72 MMHG | HEIGHT: 62 IN | HEART RATE: 75 BPM | RESPIRATION RATE: 16 BRPM | TEMPERATURE: 98.3 F | BODY MASS INDEX: 26.68 KG/M2 | SYSTOLIC BLOOD PRESSURE: 133 MMHG

## 2022-05-03 LAB
ALBUMIN SERPL-MCNC: 3.3 G/DL (ref 3.5–5)
ALBUMIN/GLOB SERPL: 1.2 {RATIO} (ref 1.1–2.2)
ALP SERPL-CCNC: 71 U/L (ref 45–117)
ALT SERPL-CCNC: 81 U/L (ref 12–78)
ANION GAP SERPL CALC-SCNC: 11 MMOL/L (ref 5–15)
AST SERPL-CCNC: 81 U/L (ref 15–37)
BASOPHILS # BLD: 0 K/UL (ref 0–0.1)
BASOPHILS NFR BLD: 0 % (ref 0–1)
BILIRUB SERPL-MCNC: 0.6 MG/DL (ref 0.2–1)
BUN SERPL-MCNC: 7 MG/DL (ref 6–20)
BUN/CREAT SERPL: 9 (ref 12–20)
CALCIUM SERPL-MCNC: 8.4 MG/DL (ref 8.5–10.1)
CHLORIDE SERPL-SCNC: 106 MMOL/L (ref 97–108)
CO2 SERPL-SCNC: 24 MMOL/L (ref 21–32)
CREAT SERPL-MCNC: 0.82 MG/DL (ref 0.55–1.02)
DIFFERENTIAL METHOD BLD: ABNORMAL
EOSINOPHIL # BLD: 0 K/UL (ref 0–0.4)
EOSINOPHIL NFR BLD: 0 % (ref 0–7)
ERYTHROCYTE [DISTWIDTH] IN BLOOD BY AUTOMATED COUNT: 12.6 % (ref 11.5–14.5)
GLOBULIN SER CALC-MCNC: 2.7 G/DL (ref 2–4)
GLUCOSE SERPL-MCNC: 115 MG/DL (ref 65–100)
HCT VFR BLD AUTO: 37.6 % (ref 35–47)
HGB BLD-MCNC: 12.5 G/DL (ref 11.5–16)
IMM GRANULOCYTES # BLD AUTO: 0.1 K/UL (ref 0–0.04)
IMM GRANULOCYTES NFR BLD AUTO: 1 % (ref 0–0.5)
LIPASE SERPL-CCNC: 219 U/L (ref 73–393)
LYMPHOCYTES # BLD: 1.1 K/UL (ref 0.8–3.5)
LYMPHOCYTES NFR BLD: 9 % (ref 12–49)
MCH RBC QN AUTO: 30.6 PG (ref 26–34)
MCHC RBC AUTO-ENTMCNC: 33.2 G/DL (ref 30–36.5)
MCV RBC AUTO: 91.9 FL (ref 80–99)
MONOCYTES # BLD: 0.9 K/UL (ref 0–1)
MONOCYTES NFR BLD: 7 % (ref 5–13)
NEUTS SEG # BLD: 10.4 K/UL (ref 1.8–8)
NEUTS SEG NFR BLD: 83 % (ref 32–75)
NRBC # BLD: 0 K/UL (ref 0–0.01)
NRBC BLD-RTO: 0 PER 100 WBC
PLATELET # BLD AUTO: 214 K/UL (ref 150–400)
PMV BLD AUTO: 11 FL (ref 8.9–12.9)
POTASSIUM SERPL-SCNC: 3.6 MMOL/L (ref 3.5–5.1)
PROT SERPL-MCNC: 6 G/DL (ref 6.4–8.2)
RBC # BLD AUTO: 4.09 M/UL (ref 3.8–5.2)
SODIUM SERPL-SCNC: 141 MMOL/L (ref 136–145)
WBC # BLD AUTO: 12.7 K/UL (ref 3.6–11)

## 2022-05-03 PROCEDURE — 83690 ASSAY OF LIPASE: CPT

## 2022-05-03 PROCEDURE — 85025 COMPLETE CBC W/AUTO DIFF WBC: CPT

## 2022-05-03 PROCEDURE — 74011000250 HC RX REV CODE- 250: Performed by: SURGERY

## 2022-05-03 PROCEDURE — 80053 COMPREHEN METABOLIC PANEL: CPT

## 2022-05-03 PROCEDURE — 74011250637 HC RX REV CODE- 250/637: Performed by: SURGERY

## 2022-05-03 PROCEDURE — 36415 COLL VENOUS BLD VENIPUNCTURE: CPT

## 2022-05-03 PROCEDURE — 74011250636 HC RX REV CODE- 250/636: Performed by: SURGERY

## 2022-05-03 PROCEDURE — C9113 INJ PANTOPRAZOLE SODIUM, VIA: HCPCS | Performed by: SURGERY

## 2022-05-03 PROCEDURE — 99024 POSTOP FOLLOW-UP VISIT: CPT

## 2022-05-03 RX ORDER — OXYCODONE HYDROCHLORIDE 5 MG/1
5 TABLET ORAL
Qty: 12 TABLET | Refills: 0 | Status: SHIPPED | OUTPATIENT
Start: 2022-05-03 | End: 2022-05-06

## 2022-05-03 RX ORDER — ONDANSETRON 4 MG/1
4 TABLET, FILM COATED ORAL
Qty: 12 TABLET | Refills: 0 | Status: SHIPPED | OUTPATIENT
Start: 2022-05-03 | End: 2022-05-07

## 2022-05-03 RX ADMIN — CETIRIZINE HYDROCHLORIDE 10 MG: 10 TABLET, FILM COATED ORAL at 08:58

## 2022-05-03 RX ADMIN — SODIUM CHLORIDE, PRESERVATIVE FREE 10 ML: 5 INJECTION INTRAVENOUS at 08:58

## 2022-05-03 RX ADMIN — SODIUM CHLORIDE, POTASSIUM CHLORIDE, SODIUM LACTATE AND CALCIUM CHLORIDE 100 ML/HR: 600; 310; 30; 20 INJECTION, SOLUTION INTRAVENOUS at 06:07

## 2022-05-03 RX ADMIN — SODIUM CHLORIDE, PRESERVATIVE FREE 10 ML: 5 INJECTION INTRAVENOUS at 06:07

## 2022-05-03 RX ADMIN — PANTOPRAZOLE SODIUM 40 MG: 40 INJECTION, POWDER, FOR SOLUTION INTRAVENOUS at 08:58

## 2022-05-03 RX ADMIN — ENOXAPARIN SODIUM 40 MG: 100 INJECTION SUBCUTANEOUS at 08:58

## 2022-05-03 NOTE — OP NOTES
[address]   OPERATIVE REPORT    Name:  Marizol Aguila  MR#:  [id number]  :  1955  ACCOUNT #:  [de-identified]  DATE OF SERVICE:  2022      PREOPERATIVE DIAGNOSIS:  Biliary pancreatitis. POSTOPERATIVE DIAGNOSIS:  Biliary pancreatitis. PROCEDURE PERFORMED:  Laparoscopic cholecystectomy with intraoperative cholangiogram (CPT 02881). SURGEON:  Andressa Lainez MD    ASSISTANT:  Claudia Chandler SA    ANESTHESIA:  General endotracheal.    COMPLICATIONS:  None. SPECIMENS REMOVED:  Gallbladder with contents. IMPLANTS:  None. ESTIMATED BLOOD LOSS:  40 mL. DRAINS:  None. COUNTS:  Sponge count correct. Needle count correct. INDICATIONS:  The patient is a 22-year-old white female recently admitted with pancreatitis, ultimately found to be related to gallstones. Over the weekend, serum lipase and liver function tests have returned towards normal, along with improvement in pain and nausea. She is taken to the operating room today for laparoscopic cholecystectomy with intraoperative cholangiogram.    FINDINGS:  1. Chronic cholecystitis with cholelithiasis. 2.  Dilated common bile duct without filling defect. PROCEDURE:  The patient was identified as the correct patient in the preoperative holding area and informed consent was confirmed. After answering the patient's remaining questions, she was taken to the operating room and placed on the operating room table in the supine position. Sequential compression devices were placed on both lower extremities. Following the uneventful initiation of general anesthesia, she was carefully secured to the operating room table with footboard and safety strap in place. All potential pressure points were padded with eggcrate. Her abdomen was prepped and draped in the usual sterile fashion. Final time-out was performed, and it was confirmed she had received intravenous antibiotics.   A 5-mm trocar was inserted through a small right upper quadrant skin incision using an Optiview technique. After confirming intraperitoneal location of the trocar tip, insufflation with carbon dioxide gas was initiated. Once adequate working sites had been developed, a 5-mm, 30-degree laparoscope was inserted. No signs of trocar injury were present. A 20-QE supraumbilical trocar was inserted through a small incision using visual guidance with the laparoscope. The patient was placed in a steep reverse Trendelenburg position. Two upper abdominal 5-mm trocars were inserted using visual guidance with the laparoscope. The gallbladder was grasped at the fundus with retraction oriented cephalad. Omental adhesions to the body of gallbladder were taken down using electrocautery. This exposed infundibulum, which was grasped and retracted laterally, thus opening Calot's triangle. Peritoneal tissue was stripped away from the infundibulum in the direction of the portal structures. This allowed identification of the cystic artery and cystic duct. Each structure was circumferentially dissected free from surrounding tissue. Once the critical view of safety had been achieved, the cystic artery was doubly ligated between titanium hemoclips and then divided. The cystic duct was milked towards the infundibulum, and a clip was placed at the junction of the infundibulum and the cystic duct. Endo Roman Heckler was used to make an opening in the cystic duct, and through this opening, a cholangiocatheter was passed and secured in position with its balloon tip. Intraoperative cholangiogram was then performed using half-strength Isovue. Prompt filling of the intrahepatic and extrahepatic biliary system was demonstrated, with passage of contrast into the pancreatic duct and into the duodenum. The common bile duct was dilated, but no filling defects were identified. Following completion of the cholangiogram, the cholangiocatheter was removed.   The cystic duct was controlled with triply placed titanium clips and then divided. The gallbladder was dissected free from the gallbladder fossa of the liver using electrocautery and blunt dissection. Once freed from the liver, the gallbladder was placed within a retrieval bag and removed from the patient's body. Once pneumoperitoneum had been re-established, the right upper quadrant was irrigated with sterile saline. Bleeding points within the hepatic parenchyma were controlled using electrocautery and Surgicel powder. After confirming adequate hemostasis, the 12-mm fascial defect was closed with a series of 0 Vicryl sutures using a laparoscopic suture passer. Pneumoperitoneum was released, and all trocars were removed. All wounds were infiltrated with 0.5% Marcaine without epinephrine. All skin edges were reapproximated with a combination of subcuticular 4-0 Monocryl suture and Dermabond. The patient tolerated the procedure well. She was extubated in the operating room and transported to the recovery area in stable condition. The attending surgeon, Dr. Nissa Fernandez, was scrubbed and present for the entire procedure.         Aldo Neal MD      [id number]  D:  05/02/2022 18:09  T:  05/03/2022 2:26  JOB #:  [id number]

## 2022-05-03 NOTE — DISCHARGE SUMMARY
Discharge Summary       PATIENT ID: Jonatan Brookville  MRN: [id number]   YOB: 1955    DATE OF ADMISSION: 4/27/2022 12:51 PM    DATE OF DISCHARGE: 5/3/2022   PRIMARY CARE PROVIDER: Kady Cole MD     ATTENDING PHYSICIAN: Daphnie Flores MD  DISCHARGING PROVIDER: Mohinder Lr NP    To contact this individual call [phone] and ask the  to page. If unavailable ask to be transferred the Adult Hospitalist Department. CONSULTATIONS: IP CONSULT TO HOSPITALIST  IP CONSULT TO GASTROENTEROLOGY  IP CONSULT TO GENERAL SURGERY    PROCEDURES/SURGERIES: Procedure(s):  LAPAROSCOPIC CHOLECYSTECTOMY WITH CHOLANGIOGRAM    ADMITTING [address] [place] [address] COURSE:     Per H&P, This is a 57-year-old female with past medical history of anxiety, anemia.  She comes over here because of abdominal pain that started this morning.  The patient says that this morning when she woke up, she was perfectly at her baseline.  She started having breakfast and soon after she started having epigastric abdominal pain.  She states that the pain was about 10/10 in intensity, sharp, nonradiating in the epigastric region associated with nausea and vomiting.  In fact, she vomited about 4-5 times.  At no point of time did she vomit any blood.  She did not have any diarrhea.  No fever, cough, headache, dizziness, and no changes in bowel movements. DISCHARGE DIAGNOSES / PLAN:      Acute pancreatitis  Her leukocytosis is improving, was 20.9 yesterday, it is improved, 10.8 today  T bili and transaminases are within normal limits  Lipase was elevated, greater than 3000 at presentation, normal lipase today  Gastroenterology following, appreciate recs  Underwent EGD 4/28 with endoscopic ultrasound with sludge and small stones in the gallbladder as well as biliary tree ranging in diameter between 4 and 8 mm. MRI/MRCP 4/27 with extrahepatic biliary dilatation with pancreatic duct dilatation.   Findings also consistent with acute pancreatitis  Gallbladder wall thickening also noted  Laparoscopic cholecystectomy without complications  Continuing a couple of days of analgesics  To follow-up with general surgery                      Seasonal allergies  Stable  Continue cetirizine  Sinus headache improved  Continuing saline nasal spray and acetaminophen                             PENDING TEST RESULTS:   At the time of discharge the following test results are still pending:     FOLLOW UP APPOINTMENTS:    Follow-up Information     Follow up With Specialties Details Why [address] [facility], [address] Internal Medicine   [address]      Juve Tidwell MD Internal Medicine In 1 week  [facility]  [phone]      Adeline Conte MD General Surgery, [facility], Breast Surgery In 1 week  [address] [place] [address]  [phone]             ADDITIONAL CARE RECOMMENDATIONS:     DIET: Low fat, Low cholesterol      ACTIVITY: Activity as tolerated    WOUND CARE: na    EQUIPMENT needed: na      DISCHARGE MEDICATIONS:  Current Discharge Medication List      START taking these medications    Details   oxyCODONE IR (ROXICODONE) 5 mg immediate release tablet Take 1 Tablet by mouth every six (6) hours as needed for Pain for up to 3 days. Max Daily Amount: 20 mg.  Qty: 12 Tablet, Refills: 0  Start date: 5/3/2022, End date: 5/6/2022    Associated Diagnoses: Idiopathic acute pancreatitis without infection or necrosis; Acute biliary pancreatitis, unspecified complication status      ondansetron hcl (ZOFRAN) 4 mg tablet Take 1 Tablet by mouth every eight (8) hours as needed for Nausea, Vomiting or Nausea or Vomiting for up to 4 days. Qty: 12 Tablet, Refills: 0  Start date: 5/3/2022, End date: 5/7/2022         CONTINUE these medications which have NOT CHANGED    Details   denosumab (Prolia) 60 mg/mL injection 60 mg by SubCUTAneous route once.  September 2019      multivitamin (ONE A DAY) tablet Take 1 Tab by mouth daily. Includes 1000 units VITAMIN D      cetirizine (ZYRTEC) 10 mg tablet Take 10 mg by mouth daily. alendronate (FOSAMAX) 70 mg tablet TAKE 1 TABLET EVERY SUNDAY  Qty: 12 Tab, Refills: 0      alprazolam (XANAX) 0.5 mg tablet Take 1 tablet by mouth every eight (8) hours as needed for Anxiety (related to flying or claustrophobia). Qty: 30 tablet, Refills: 0               NOTIFY YOUR PHYSICIAN FOR ANY OF THE FOLLOWING:   Fever over 101 degrees for 24 hours. Chest pain, shortness of breath, fever, chills, nausea, vomiting, diarrhea, change in mentation, falling, weakness, bleeding. Severe pain or pain not relieved by medications. Or, any other signs or symptoms that you may have questions about.     DISPOSITION:    Home With:   OT  PT  HH  RN       Long term SNF/Inpatient Rehab   x Independent    Hospice    Other:       PATIENT CONDITION AT DISCHARGE:     Functional status    Poor     Deconditioned    x Independent      Cognition    xx Lucid     Forgetful     Dementia      Catheters/lines (plus indication)    Jo     PICC     PEG    x None      Code status    x Full code     DNR      PHYSICAL EXAMINATION AT DISCHARGE:   Refer to Progress Note      CHRONIC MEDICAL DIAGNOSES:  Problem List as of 5/3/2022 Date Reviewed: 3/1/2015          Codes Class Noted - Resolved    Pancreatitis ICD-10-CM: K85.90  ICD-9-CM: 381.8  4/29/2022 - Present        Acute pancreatitis ICD-10-CM: K85.90  ICD-9-CM: 820.3  4/27/2022 - Present        Osteoporosis ICD-10-CM: M81.0  ICD-9-CM: 733.00  2/14/2014 - Present    Overview Signed 2/14/2014  1:22 PM by Eamon Morel MD     2/2014             Screening exams for skin cancer ICD-10-CM: Z12.83  ICD-9-CM: V76.43  1/21/2014 - Present    Overview Signed 1/21/2014  9:48 AM by Eamon Morel MD     Derm Dr Kenisha Miguel; AK's, Jude K's             Cystocele & Rectocele ICD-10-CM: QLP8422  ICD-9-CM: SNI0897  4/10/2012 - Present    Overview Signed 4/10/2012  2:39 PM by Pattie Carrion MD     Dx early 2000 by Gyn, unrepaired             Vitamin D deficiency ICD-10-CM: E55.9  ICD-9-CM: 268.9  4/10/2012 - Present    Overview Addendum 4/10/2012  3:00 PM by Pattie Carrion MD     12/2011, detected at Derm's office             H/O Right groin pain r/t labral tear, 2002 ICD-10-CM: R10.31  ICD-9-CM: 789.03  4/10/2012 - Present    Overview Signed 4/10/2012  3:03 PM by Pattie Carrion MD     Ortho Dr Chelsea Pierson             Incisional hernia at hysterectomy scar ICD-10-CM: K43.2  ICD-9-CM: 553.21  4/10/2012 - Present    Overview Signed 4/10/2012  3:04 PM by Pattie Carrion MD     Eval by surgeon Dr Sierra Llanes 2005; declined surgery at the time             H/O PVCs (premature ventricular contractions) ICD-10-CM: I49.3  ICD-9-CM: 427.69  4/10/2012 - Present    Overview Addendum 1/9/2013  2:33 PM by Pattie Carrion MD     Otherwise benign, w/u negative per Cardio since 2001, VCS Dr Demond Manjarrez; Holter, Stress Echo             Perennial allergic rhinitis ICD-10-CM: J30.89  ICD-9-CM: 477.8  5/11/2011 - Present    Overview Signed 5/11/2011  3:27 PM by Pattie Carrion MD     Immunotherapy 1995;  Allergist Dr Esteban Daugherty             H/O Asthma, Mild Intermittent ICD-10-CM: J45.909  ICD-9-CM: 493.90  5/11/2011 - Present    Overview Signed 5/11/2011  3:51 PM by Pattie Carrion MD     Remotely             Chronic insomnia ICD-10-CM: F51.04  ICD-9-CM: 780.52  5/11/2011 - Present    Overview Signed 5/11/2011  3:58 PM by Pattie Carrion MD     Ambien 10 mg or Xanax prn in past per Dr Lori Mobley: F40.240  ICD-9-CM: 300.29  5/11/2011 - Present        Anxiety & Panic Attacks r/t flying, travel, claustrophobia ICD-10-CM: F41.9  ICD-9-CM: 300.00  5/11/2011 - Present    Overview Addendum 5/11/2011  3:59 PM by Pattie Carrion MD     Since the 9546'J; Xanax prn works better than Ativan             Fracture of left foot, 2nd, 3rd, 4th Metatarsals 6/2008 ICD-10-CM: B62.836B  ICD-9-CM: 825.20  5/11/2011 - Present    Overview Signed 5/11/2011  3:40 PM by Dawna Cardona MD     Ortho Dr Coy Signs             Left ankle sprain 5/2009 ICD-10-CM: P58.049G  ICD-9-CM: 845.00  5/11/2011 - Present    Overview Signed 5/11/2011  3:43 PM by Dawna Cardona MD     Ortho Dr Coy Signs; PT for a few months             Chronic Left Ankle Swelling ICD-10-CM: M25.472  ICD-9-CM: 719.07  5/11/2011 - Present    Overview Signed 5/11/2011  3:54 PM by Dawna Cardona MD     S/p trauma             History of severe iron deficiency anemia r/t menorrhagia, s/p hysterectomy ICD-10-CM: Z86.2  ICD-9-CM: V12.3  5/11/2011 - Present    Overview Addendum 4/10/2012  3:05 PM by Dawna Cardona MD     S/P bone marrow bx 1979             H/O Migraines ICD-10-CM: Z29.914  ICD-9-CM: 346.90  5/10/2011 - Present    Overview Addendum 4/10/2012  3:05 PM by Dawna Cardona MD     In past Fiorinal or Obdulio per Dr Clay Both; s/p Neuro Dr Kevin Wells than 30 minutes were spent with the patient on counseling and coordination of care    Signed:   Mohinder Lr NP  5/3/2022  10:26 AM

## 2022-05-03 NOTE — PROGRESS NOTES
0730: Bedside and Verbal shift change report given to Mo,RN (oncoming nurse) by Javid Duque RN (offgoing nurse). Report included the following information SBAR, Kardex, Intake/Output, MAR and Recent Results.

## 2022-05-03 NOTE — PROGRESS NOTES
General Surgery Daily Progress Note    Admit Date: 2022  Post-Operative Day: 1 Day Post-Op from Procedure(s):  LAPAROSCOPIC CHOLECYSTECTOMY WITH CHOLANGIOGRAM     Subjective:   CC:   Chief Complaint   Patient presents with    Abdominal Pain        Last 24 hrs: patient doing well post op. Tolerating PO; no nausea/vomiting. Minimal pain. Passing flatus; No BM yet. Anxious to go home today          Objective:     Blood pressure 133/72, pulse 75, temperature 98.3 °F (36.8 °C), resp. rate 16, height 5' 2\" (1.575 m), weight 145 lb (65.8 kg), SpO2 96 %. Temp (24hrs), Av.8 °F (36.6 °C), Min:97.4 °F (36.3 °C), Max:98.3 °F (36.8 °C)      _____________________  Physical Exam:     Alert and Oriented x 3, in no acute distress.   Cardiovascular: RRR, No peripheral edema  Abdomen: soft, +BS, lap sites C/D/I    Data Review:    Recent Labs     22  0620 22  0130   WBC 12.7* 7.1 7.4   HGB 12.5 11.9 12.2   HCT 37.6 35.3 35.7    205 193     Recent Labs     22  01522  0302 22  0130    142 144   K 3.6 3.6 3.6    109* 110*   CO2 24 25 25   * 99 92   BUN 7 4* 5*   CREA 0.82 0.84 0.74   CA 8.4* 8.8 8.5   ALB 3.3* 3.3* 3.2*   ALT 81* 42 37     Recent Labs     22  0302 22  0130   LPSE 219 502* 762*           ______________________  Medications:    Current Facility-Administered Medications   Medication Dose Route Frequency    HYDROmorphone (DILAUDID) injection 1 mg  1 mg IntraVENous Q4H PRN    oxyCODONE IR (ROXICODONE) tablet 5 mg  5 mg Oral Q6H PRN    sodium chloride (OCEAN) 0.65 % nasal squeeze bottle 2 Spray  2 Spray Both Nostrils Q2H PRN    benzocaine-menthoL (CEPACOL) lozenge 1 Lozenge  1 Lozenge Mucous Membrane PRN    prochlorperazine (COMPAZINE) with saline injection 5 mg  5 mg IntraVENous Q6H PRN    sodium chloride (NS) flush 5-40 mL  5-40 mL IntraVENous Q8H    sodium chloride (NS) flush 5-40 mL  5-40 mL IntraVENous PRN    cetirizine (ZYRTEC) tablet 10 mg  10 mg Oral DAILY    ALPRAZolam (XANAX) tablet 0.5 mg  0.5 mg Oral Q8H PRN    sodium chloride (NS) flush 5-40 mL  5-40 mL IntraVENous Q8H    sodium chloride (NS) flush 5-40 mL  5-40 mL IntraVENous PRN    acetaminophen (TYLENOL) tablet 650 mg  650 mg Oral Q6H PRN    Or    acetaminophen (TYLENOL) suppository 650 mg  650 mg Rectal Q6H PRN    polyethylene glycol (MIRALAX) packet 17 g  17 g Oral DAILY PRN    ondansetron (ZOFRAN) injection 4 mg  4 mg IntraVENous Q6H PRN    enoxaparin (LOVENOX) injection 40 mg  40 mg SubCUTAneous DAILY    pantoprazole (PROTONIX) injection 40 mg  40 mg IntraVENous Q12H    And    sodium chloride (NS) flush 10 mL  10 mL IntraVENous Q12H               Assessment:   Active Problems:    Acute pancreatitis (4/27/2022)      Pancreatitis (4/29/2022)            Plan:     Patient doing well postoperatively and may discharge home when ready   F/u with Dr. Reema Reis two weeks       Mort Ormond, NP    5/3/2022

## 2022-05-03 NOTE — PROGRESS NOTES
Problem: Falls - Risk of  Goal: *Absence of Falls  Description: Document Riley Gonzalez Fall Risk and appropriate interventions in the flowsheet.   Outcome: Progressing Towards Goal  Note: Fall Risk Interventions:            Medication Interventions: Evaluate medications/consider consulting pharmacy

## 2022-05-03 NOTE — PROGRESS NOTES
Problem: Falls - Risk of  Goal: *Absence of Falls  Description: Document Laura Grande Fall Risk and appropriate interventions in the flowsheet.   Outcome: Progressing Towards Goal  Note: Fall Risk Interventions:            Medication Interventions: Evaluate medications/consider consulting pharmacy,Teach patient to arise slowly                   Problem: Patient Education: Go to Patient Education Activity  Goal: Patient/Family Education  Outcome: Progressing Towards Goal

## 2022-05-03 NOTE — DISCHARGE INSTRUCTIONS
Patient Education   Call [phone] to schedule surgery follow-up appointment for 2 weeks after hospital discharge. May shower. Gallbladder Removal Surgery: What to Expect at Home  Your Recovery  After your surgery, you will likely feel weak and tired for several days after you return home. Your belly may be swollen. If you had laparoscopic surgery, it's normal to also have some shoulder pain. This is caused by the air that your doctor put in your belly to help see your organs better. You may have gas or need to burp a lot at first. A few people get diarrhea. The diarrhea usually goes away in 2 to 4 weeks, but it may last longer. How quickly you recover depends on whether you had a laparoscopic or open surgery. · For a laparoscopic surgery, most people can go back to work or their normal routine in 1 to 2 weeks. But it may take longer, depending on the type of work you do. · For an open surgery, it will probably take 4 to 6 weeks before you get back to your normal routine. This care sheet gives you a general idea about how long it will take for you to recover. However, each person recovers at a different pace. Follow the steps below to get better as quickly as possible. How can you care for yourself at home? Activity    · Rest when you feel tired. Getting enough sleep will help you recover.     · Try to walk each day. Start out by walking a little more than you did the day before. Walking helps prevent blood clots in your legs and pneumonia.     · For about 2 to 4 weeks, avoid lifting anything that would make you strain.  This may include a child, heavy grocery bags and milk containers, a heavy briefcase or backpack, cat litter or dog food bags, or a vacuum .     · Avoid strenuous activities, such as biking, jogging, weightlifting, and aerobic exercise, until your doctor says it is okay.     · Ask your doctor when you can drive again.     · For a laparoscopic surgery, most people can go back to work or their normal routine in 1 to 2 weeks, but it may take longer. For an open surgery, it will probably take 4 to 6 weeks before you get back to your normal routine.     · Your doctor will tell you when you can have sex again. Diet    · When you feel like eating, start with small amounts of food. You may want to avoid fatty foods like fried foods or cheese for a while. They can cause symptoms, such as diarrhea or bloating.     · Drink plenty of fluids (unless your doctor tells you not to).     · You may notice that your bowel movements are not regular right after your surgery. This is common. Try to avoid constipation and straining with bowel movements. You may want to take a fiber supplement every day. If you have not had a bowel movement after a couple of days, ask your doctor about taking a mild laxative. Medicines    · Your doctor will tell you if and when you can restart your medicines. You will also be given instructions about taking any new medicines.     · If you take aspirin or some other blood thinner, ask your doctor if and when to start taking it again. Make sure that you understand exactly what your doctor wants you to do.     · Be safe with medicines. Read and follow all instructions on the label. ? If the doctor gave you a prescription medicine for pain, take it as prescribed. ? If you are not taking a prescription pain medicine, ask your doctor if you can take an over-the-counter medicine. ? Do not take two or more pain medicines at the same time unless the doctor told you to. Many pain medicines contain acetaminophen, which is Tylenol. Too much Tylenol can be harmful.     · If you think your pain medicine is making you sick to your stomach:  ? Take your medicine after meals (unless your doctor tells you not to). ? Ask your doctor for a different pain medicine.     · If your doctor prescribed antibiotics, take them as directed. Do not stop taking them just because you feel better.  You need to take the full course of antibiotics. Incision care    · If you have strips of tape on the cut (incision) the doctor made, leave the tape on for a week or until it falls off.     · You may shower 24 to 48 hours after surgery, if your doctor okays it. Pat the incision dry. Do not take a bath for the first 2 weeks, or until your doctor tells you it's okay.     · You may have staples to hold the cut together. Keep them dry until your doctor takes them out. This is usually in 7 to 10 days.     · Keep the area clean and dry. You may cover it with a gauze bandage if it oozes fluid or rubs against clothing. Change the bandage every day. Ice    · To reduce swelling and pain, put ice or a cold pack on your belly for 10 to 20 minutes at a time. Do this every 1 to 2 hours. Put a thin cloth between the ice and your skin. Follow-up care is a key part of your treatment and safety. Be sure to make and go to all appointments, and call your doctor if you are having problems. It's also a good idea to know your test results and keep a list of the medicines you take. When should you call for help? Call 911 anytime you think you may need emergency care. For example, call if:    · You passed out (lost consciousness).     · You are short of breath. .   Call your doctor now or seek immediate medical care if:    · You are sick to your stomach and cannot drink fluids.     · You have pain that does not get better when you take your pain medicine.     · You cannot pass stools or gas.     · You have signs of infection, such as:  ? Increased pain, swelling, warmth, or redness. ? Red streaks leading from the incision. ? Pus draining from the incision. ?  A fever.     · Bright red blood has soaked through the bandage over your incision.     · You have loose stitches, or your incision comes open.     · You have signs of a blood clot in your leg (called a deep vein thrombosis), such as:  ? Pain in your calf, back of knee, thigh, or groin.  ? Redness and swelling in your leg or groin. Watch closely for any changes in your health, and be sure to contact your doctor if you have any problems. Where can you learn more? Go to http://www.gray.com/  Enter F357 in the search box to learn more about \"Gallbladder Removal Surgery: What to Expect at Home. \"  Current as of: September 8, 2021               Content Version: 13.2  © 2006-2022 [employer]. Care instructions adapted under license by [employer] (which disclaims liability or warranty for this information). If you have questions about a medical condition or this instruction, always ask your healthcare professional. [place] [address] any warranty or liability for your use of this information. Discharge Instructions       PATIENT ID: Balbir Bhatia  MRN: [id number]   YOB: 1955    DATE OF ADMISSION: 4/27/2022 12:51 PM    DATE OF DISCHARGE: 5/3/2022    PRIMARY CARE PROVIDER: Jose Ruiz MD     ATTENDING PHYSICIAN: Alondra Rockwell MD  DISCHARGING PROVIDER: Romaine De Souza NP    To contact this individual call [phone] and ask the  to page. If unavailable ask to be transferred the Adult Hospitalist Department.     DISCHARGE DIAGNOSES gallstone pancreatitis    CONSULTATIONS: IP CONSULT TO HOSPITALIST  IP CONSULT TO GASTROENTEROLOGY  IP CONSULT TO GENERAL SURGERY    PROCEDURES/SURGERIES: Procedure(s):  LAPAROSCOPIC CHOLECYSTECTOMY WITH CHOLANGIOGRAM    PENDING TEST RESULTS:   At the time of discharge the following test results are still pending:     FOLLOW UP APPOINTMENTS:   Follow-up Information     Follow up With Specialties Details Why [address], MD Internal Medicine   [address]      Jose Ruiz MD Internal Medicine In 1 week  [address]      [place], MD General Surgery, Bariatrics, Breast Surgery In 1 week  [address] [place] [address]  [address]  [phone]             ADDITIONAL CARE RECOMMENDATIONS:     DIET: Low fat, Low cholesterol  ACTIVITY: Activity as tolerated    WOUND CARE: na    EQUIPMENT needed: na      DISCHARGE MEDICATIONS:   See Medication Reconciliation Form    · It is important that you take the medication exactly as they are prescribed. · Keep your medication in the bottles provided by the pharmacist and keep a list of the medication names, dosages, and times to be taken in your wallet. · Do not take other medications without consulting your doctor. NOTIFY YOUR PHYSICIAN FOR ANY OF THE FOLLOWING:   Fever over 101 degrees for 24 hours. Chest pain, shortness of breath, fever, chills, nausea, vomiting, diarrhea, change in mentation, falling, weakness, bleeding. Severe pain or pain not relieved by medications. Or, any other signs or symptoms that you may have questions about.       DISPOSITION:    Home With:   OT  PT  [place]  RN       SNF/Inpatient Rehab/LTAC   x Independent    Hospice    Other:         Signed:   Ayesha Cohen NP  5/3/2022  10:25 AM

## 2022-05-12 NOTE — PROGRESS NOTES
Physician Progress Note      Jas Welch  CSN #:                  [id number]  :                       1955  ADMIT DATE:       2022 12:51 PM  [address] DATE:        5/3/2022 1:15 PM  RESPONDING  PROVIDER #:        [address] NP          QUERY TEXT:    Patient admitted with Gallstone Pancreatitis and noted to have , RR 16 up to 25, WBC 20.9 on admission. If possible, please document in progress notes and discharge summary if you are evaluating and/or treating any of the following: The medical record reflects the following:  Risk Factors: Gallstone pancreatitis  Clinical Indicators: admitted with nausea and vomiting, with epigastric pain. , RR 16 (25 max), with WBC 2.9 on admission. Pt found to have Gallstone Pancreatitis. Treatment: 1L NS IVF, followed by IVF at 125 cc/hr, Mefoxin IV. Thank you,  Jennyfer Mims RN  Clinical Documentation  [phone]  Options provided:  -- SIRS of non-infectious origin due to noninfectious pancreatitis  -- Signs and symptoms of local inflammation only  -- Other - I will add my own diagnosis  -- Disagree - Not applicable / Not valid  -- Disagree - Clinically unable to determine / Unknown  -- Refer to Clinical Documentation Reviewer    PROVIDER RESPONSE TEXT:    This patient has SIRS of non-infectious origin due to noninfectious pancreatitis.     Query created by: Trupti Johnson on 2022 9:58 AM      Electronically signed by:  Rita Cabrera NP 2022 7:24 AM

## 2022-05-17 ENCOUNTER — OFFICE VISIT (OUTPATIENT)
Dept: SURGERY | Age: 67
End: 2022-05-17
Payer: MEDICARE

## 2022-05-17 VITALS
TEMPERATURE: 98 F | OXYGEN SATURATION: 97 % | HEART RATE: 76 BPM | WEIGHT: 163 LBS | DIASTOLIC BLOOD PRESSURE: 69 MMHG | HEIGHT: 62 IN | RESPIRATION RATE: 20 BRPM | BODY MASS INDEX: 30 KG/M2 | SYSTOLIC BLOOD PRESSURE: 100 MMHG

## 2022-05-17 DIAGNOSIS — Z09 POSTOPERATIVE EXAMINATION: Primary | ICD-10-CM

## 2022-05-17 PROCEDURE — 99024 POSTOP FOLLOW-UP VISIT: CPT | Performed by: SURGERY

## 2022-05-17 NOTE — PROGRESS NOTES
1. Have you been to the ER, urgent care clinic since your last visit? Hospitalized since your last visit? No    2. Have you seen or consulted any other health care providers outside of the [address] [place] [address] since your last visit? Include any pap smears or colon screening.  No

## 2022-05-19 NOTE — PROGRESS NOTES
Subjective:      Moises Dance is a 74 y.o. female presents for postop care 2 weeks following laparoscopic cholecystectomy with cholangiogram.   Appetite is good. Eating a regular diet without difficulty. Bowel movements are regular. The patient is voiding without difficulty. She notes improving incisional pain. She does not require opioid analgesics. No fever, chills, or wound drainage. Objective:     Visit Vitals  /69   Pulse 76   Temp 98 °F (36.7 °C)   Resp 20   Ht 5' 2\" (1.575 m)   Wt 163 lb (73.9 kg)   SpO2 97%   BMI 29.81 kg/m²       General:  alert, cooperative, no distress, appears stated age   Abdomen: soft, bowel sounds active, non-tender, no hernias   Incision:   healing well, no drainage, no erythema, no hernia, no seroma, no swelling, no dehiscence, incision well approximated     Assessment:     Doing well postoperatively. Plan:     1. Continue current medications. 2.  Shower, pat wounds dry. 3.  May increase activity as tolerated.

## 2022-05-19 NOTE — PATIENT INSTRUCTIONS
Gallbladder Removal Surgery: What to Expect at Home  Your Recovery  After your surgery, you will likely feel weak and tired for several days after you return home. Your belly may be swollen. If you had laparoscopic surgery, it's normal to also have some shoulder pain. This is caused by the air that your doctor put in your belly to help see your organs better. You may have gas or need to burp a lot at first. A few people get diarrhea. The diarrhea usually goes away in 2 to 4 weeks, but it may last longer. How quickly you recover depends on whether you had a laparoscopic or open surgery. · For a laparoscopic surgery, most people can go back to work or their normal routine in 1 to 2 weeks. But it may take longer, depending on the type of work you do. · For an open surgery, it will probably take 4 to 6 weeks before you get back to your normal routine. This care sheet gives you a general idea about how long it will take for you to recover. However, each person recovers at a different pace. Follow the steps below to get better as quickly as possible. How can you care for yourself at home? Activity    · Rest when you feel tired. Getting enough sleep will help you recover.     · Try to walk each day. Start out by walking a little more than you did the day before. Walking helps prevent blood clots in your legs and pneumonia.     · For about 2 to 4 weeks, avoid lifting anything that would make you strain. This may include a child, heavy grocery bags and milk containers, a heavy briefcase or backpack, cat litter or dog food bags, or a vacuum .     · Avoid strenuous activities, such as biking, jogging, weightlifting, and aerobic exercise, until your doctor says it is okay.     · Ask your doctor when you can drive again.     · For a laparoscopic surgery, most people can go back to work or their normal routine in 1 to 2 weeks, but it may take longer.  For an open surgery, it will probably take 4 to 6 weeks before you get back to your normal routine.     · Your doctor will tell you when you can have sex again. Diet    · When you feel like eating, start with small amounts of food. You may want to avoid fatty foods like fried foods or cheese for a while. They can cause symptoms, such as diarrhea or bloating.     · Drink plenty of fluids (unless your doctor tells you not to).     · You may notice that your bowel movements are not regular right after your surgery. This is common. Try to avoid constipation and straining with bowel movements. You may want to take a fiber supplement every day. If you have not had a bowel movement after a couple of days, ask your doctor about taking a mild laxative. Medicines    · Your doctor will tell you if and when you can restart your medicines. You will also be given instructions about taking any new medicines.     · If you take aspirin or some other blood thinner, ask your doctor if and when to start taking it again. Make sure that you understand exactly what your doctor wants you to do.     · Be safe with medicines. Read and follow all instructions on the label. ? If the doctor gave you a prescription medicine for pain, take it as prescribed. ? If you are not taking a prescription pain medicine, ask your doctor if you can take an over-the-counter medicine. ? Do not take two or more pain medicines at the same time unless the doctor told you to. Many pain medicines contain acetaminophen, which is Tylenol. Too much Tylenol can be harmful.     · If you think your pain medicine is making you sick to your stomach:  ? Take your medicine after meals (unless your doctor tells you not to). ? Ask your doctor for a different pain medicine.     · If your doctor prescribed antibiotics, take them as directed. Do not stop taking them just because you feel better. You need to take the full course of antibiotics.    Incision care    · If you have strips of tape on the cut (incision) the doctor made, leave the tape on for a week or until it falls off.     · You may shower 24 to 48 hours after surgery, if your doctor okays it. Pat the incision dry. Do not take a bath for the first 2 weeks, or until your doctor tells you it's okay.     · You may have staples to hold the cut together. Keep them dry until your doctor takes them out. This is usually in 7 to 10 days.     · Keep the area clean and dry. You may cover it with a gauze bandage if it oozes fluid or rubs against clothing. Change the bandage every day. Ice    · To reduce swelling and pain, put ice or a cold pack on your belly for 10 to 20 minutes at a time. Do this every 1 to 2 hours. Put a thin cloth between the ice and your skin. Follow-up care is a key part of your treatment and safety. Be sure to make and go to all appointments, and call your doctor if you are having problems. It's also a good idea to know your test results and keep a list of the medicines you take. When should you call for help? Call 911 anytime you think you may need emergency care. For example, call if:    · You passed out (lost consciousness).     · You are short of breath. .   Call your doctor now or seek immediate medical care if:    · You are sick to your stomach and cannot drink fluids.     · You have pain that does not get better when you take your pain medicine.     · You cannot pass stools or gas.     · You have signs of infection, such as:  ? Increased pain, swelling, warmth, or redness. ? Red streaks leading from the incision. ? Pus draining from the incision. ? A fever.     · Bright red blood has soaked through the bandage over your incision.     · You have loose stitches, or your incision comes open.     · You have signs of a blood clot in your leg (called a deep vein thrombosis), such as:  ? Pain in your calf, back of knee, thigh, or groin. ? Redness and swelling in your leg or groin.    Watch closely for any changes in your health, and be sure to contact your doctor if you have any problems. Where can you learn more? Go to http://www.gray.com/  Enter F357 in the search box to learn more about \"Gallbladder Removal Surgery: What to Expect at Home. \"  Current as of: September 8, 2021               Content Version: 13.2  © 4414-8628 [employer], [employer]. Care instructions adapted under license by [employer] (which disclaims liability or warranty for this information). If you have questions about a medical condition or this instruction, always ask your healthcare professional. [address] any warranty or liability for your use of this information.

## 2023-08-20 ENCOUNTER — HOSPITAL ENCOUNTER (EMERGENCY)
Facility: HOSPITAL | Age: 68
Discharge: HOME | End: 2023-08-21
Attending: STUDENT IN AN ORGANIZED HEALTH CARE EDUCATION/TRAINING PROGRAM
Payer: MEDICARE

## 2023-08-20 DIAGNOSIS — B02.9 HERPES ZOSTER WITHOUT COMPLICATION: Primary | ICD-10-CM

## 2023-08-20 PROCEDURE — 99283 EMERGENCY DEPT VISIT LOW MDM: CPT

## 2023-08-20 RX ORDER — TETRACAINE HYDROCHLORIDE 5 MG/ML
1 SOLUTION OPHTHALMIC ONCE
Status: COMPLETED | OUTPATIENT
Start: 2023-08-20 | End: 2023-08-21

## 2023-08-20 ASSESSMENT — PAIN DESCRIPTION - LOCATION: LOCATION: FACE

## 2023-08-20 ASSESSMENT — PAIN SCALES - GENERAL: PAINLEVEL_OUTOF10: 9

## 2023-08-21 VITALS
BODY MASS INDEX: 31.24 KG/M2 | SYSTOLIC BLOOD PRESSURE: 130 MMHG | DIASTOLIC BLOOD PRESSURE: 96 MMHG | RESPIRATION RATE: 16 BRPM | HEART RATE: 88 BPM | WEIGHT: 169.75 LBS | OXYGEN SATURATION: 98 % | HEIGHT: 62 IN | TEMPERATURE: 98.4 F

## 2023-08-21 PROCEDURE — 6370000000 HC RX 637 (ALT 250 FOR IP): Performed by: STUDENT IN AN ORGANIZED HEALTH CARE EDUCATION/TRAINING PROGRAM

## 2023-08-21 RX ORDER — ACYCLOVIR 800 MG/1
800 TABLET ORAL
Qty: 35 TABLET | Refills: 0 | Status: SHIPPED | OUTPATIENT
Start: 2023-08-21 | End: 2023-08-28

## 2023-08-21 RX ORDER — LIDOCAINE 50 MG/G
1 PATCH TOPICAL DAILY
Qty: 10 PATCH | Refills: 0 | Status: SHIPPED | OUTPATIENT
Start: 2023-08-21 | End: 2023-08-31

## 2023-08-21 RX ORDER — HYDROCODONE BITARTRATE AND ACETAMINOPHEN 5; 325 MG/1; MG/1
1 TABLET ORAL EVERY 4 HOURS PRN
Qty: 18 TABLET | Refills: 0 | Status: SHIPPED | OUTPATIENT
Start: 2023-08-21 | End: 2023-08-24

## 2023-08-21 RX ORDER — HYDROMORPHONE HYDROCHLORIDE 2 MG/1
1 TABLET ORAL EVERY 4 HOURS PRN
Status: DISCONTINUED | OUTPATIENT
Start: 2023-08-21 | End: 2023-08-21 | Stop reason: HOSPADM

## 2023-08-21 RX ORDER — ONDANSETRON 4 MG/1
4 TABLET, ORALLY DISINTEGRATING ORAL
Status: COMPLETED | OUTPATIENT
Start: 2023-08-21 | End: 2023-08-21

## 2023-08-21 RX ORDER — PREDNISONE 50 MG/1
50 TABLET ORAL DAILY
Qty: 5 TABLET | Refills: 0 | Status: SHIPPED | OUTPATIENT
Start: 2023-08-21 | End: 2023-08-26

## 2023-08-21 RX ADMIN — TETRACAINE HYDROCHLORIDE 1 DROP: 5 SOLUTION OPHTHALMIC at 00:00

## 2023-08-21 RX ADMIN — HYDROMORPHONE HYDROCHLORIDE 1 MG: 2 TABLET ORAL at 00:07

## 2023-08-21 RX ADMIN — ONDANSETRON 4 MG: 4 TABLET, ORALLY DISINTEGRATING ORAL at 00:07

## 2023-08-21 RX ADMIN — FLUORESCEIN SODIUM 1 MG: 1 STRIP OPHTHALMIC at 00:00

## 2023-08-21 ASSESSMENT — ENCOUNTER SYMPTOMS
VOMITING: 1
NAUSEA: 1
COLOR CHANGE: 1

## 2023-08-21 NOTE — DISCHARGE INSTRUCTIONS
Please take the prescribed medications for your shingles. See your primary care doctor if symptoms do not resolve.

## 2023-08-21 NOTE — ED NOTES
Pain assessment on discharge was   Condition Stable  Patient discharged to home  Patient education was completed  Education taught to patient  Teaching method used was handout and verbal  Understanding of teaching was good  Patient was discharged ambulatory  Discharged with family  Valuables were given to patient remains in possession of belongings during stay      Tavo York RN  08/21/23 0045

## 2023-08-21 NOTE — ED TRIAGE NOTES
Pt c/o body aches and not feeling well x 2 week. Pt then had facial pain in the left side of her face that became a rash yesterday.  +emesis today

## (undated) DEVICE — 4-PORT MANIFOLD: Brand: NEPTUNE 2

## (undated) DEVICE — TROCAR SITE CLOSURE DEVICE: Brand: ENDO CLOSE

## (undated) DEVICE — SET CHOLANGIOGRAPHY 4FR L60CM W/ ARW KARLAN BLLN CATH CRV

## (undated) DEVICE — Device

## (undated) DEVICE — HYPODERMIC SAFETY NEEDLE: Brand: MAGELLAN

## (undated) DEVICE — SUTURE MCRYL SZ 4-0 L27IN ABSRB UD L19MM PS-2 1/2 CIR PRIM Y426H

## (undated) DEVICE — APPLIER CLP M/L SHFT DIA5MM 15 LIG LIGAMAX 5

## (undated) DEVICE — Device: Brand: BALLOON3

## (undated) DEVICE — GENERAL LAPAROSCOPY - SMH: Brand: MEDLINE INDUSTRIES, INC.

## (undated) DEVICE — PMI OPERATIVE CHOLANGIOGRAM CATHETER; TUBING IS 76CM IN LENGTH, 16GA WITH A: Brand: PMI

## (undated) DEVICE — TROCAR: Brand: KII® OPTICAL ACCESS SYSTEM

## (undated) DEVICE — COVER LT HNDL PLAS RIG 1 PER PK

## (undated) DEVICE — GARMENT,MEDLINE,DVT,INT,CALF,MED, GEN2: Brand: MEDLINE

## (undated) DEVICE — TROCAR: Brand: KII® SLEEVE

## (undated) DEVICE — SUTURE SZ 0 27IN 5/8 CIR UR-6  TAPER PT VIOLET ABSRB VICRYL J603H

## (undated) DEVICE — CLICKLINE SCISSORS INSERT: Brand: CLICKLINE

## (undated) DEVICE — TUBING HYDR IRR --

## (undated) DEVICE — GLOVE SURG SZ 75 L1212IN FNGR THK138MIL BRN LTX FREE

## (undated) DEVICE — BAG SPEC RETRV 275ML 10ML DISPOSABLE RELIACATCH

## (undated) DEVICE — E-Z CLEAN, PTFE COATED, ELECTROSURGICAL LAPAROSCOPIC ELECTRODE, L-HOOK, 33 CM., SINGLE-USE, FOR USE WITH HAND CONTROL PENCIL: Brand: MEGADYNE

## (undated) DEVICE — SCISSORS ENDOSCP DIA5MM CRV MPLR CAUT W/ RATCH HNDL

## (undated) DEVICE — SYSTEM EVAC SMOKE LAPARSCOPIC

## (undated) DEVICE — DERMABOND SKIN ADH 0.7ML -- DERMABOND ADVANCED 12/BX

## (undated) DEVICE — LAPAROSCOPIC TROCAR SLEEVE/SINGLE USE: Brand: KII® OPTICAL ACCESS SYSTEM

## (undated) DEVICE — ENDOLOOP SUT LIGATURE 18IN -- 12/BX PDS II